# Patient Record
Sex: FEMALE | Race: WHITE | NOT HISPANIC OR LATINO | Employment: OTHER | URBAN - METROPOLITAN AREA
[De-identification: names, ages, dates, MRNs, and addresses within clinical notes are randomized per-mention and may not be internally consistent; named-entity substitution may affect disease eponyms.]

---

## 2020-01-17 ENCOUNTER — HOSPITAL ENCOUNTER (INPATIENT)
Facility: HOSPITAL | Age: 85
LOS: 3 days | Discharge: NURSING, REHAB OR LONG-TERM CARE | DRG: 481 | End: 2020-01-20
Attending: EMERGENCY MEDICINE | Admitting: INTERNAL MEDICINE
Payer: MEDICARE

## 2020-01-17 ENCOUNTER — ANESTHESIA EVENT (INPATIENT)
Dept: PERIOP | Facility: HOSPITAL | Age: 85
DRG: 481 | End: 2020-01-17
Payer: MEDICARE

## 2020-01-17 ENCOUNTER — APPOINTMENT (EMERGENCY)
Dept: RADIOLOGY | Facility: HOSPITAL | Age: 85
DRG: 481 | End: 2020-01-17
Payer: MEDICARE
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
## 2020-01-17 DIAGNOSIS — S72.141A CLOSED INTERTROCHANTERIC FRACTURE OF HIP, RIGHT, INITIAL ENCOUNTER (HCC): ICD-10-CM

## 2020-01-17 PROBLEM — D72.829 LEUCOCYTOSIS: Status: ACTIVE | Noted: 2020-01-17

## 2020-01-17 PROBLEM — S72.001A CLOSED DISPLACED FRACTURE OF RIGHT FEMORAL NECK (HCC): Status: ACTIVE | Noted: 2020-01-17

## 2020-01-17 PROBLEM — R47.01 APHASIA: Status: ACTIVE | Noted: 2020-01-17

## 2020-01-17 PROBLEM — F03.90 DEMENTIA (HCC): Status: ACTIVE | Noted: 2020-01-17

## 2020-01-17 PROBLEM — N18.30 CKD (CHRONIC KIDNEY DISEASE) STAGE 3, GFR 30-59 ML/MIN (HCC): Status: ACTIVE | Noted: 2020-01-17

## 2020-01-17 LAB
ANION GAP SERPL CALCULATED.3IONS-SCNC: 9 MMOL/L (ref 4–13)
APTT PPP: 29 SECONDS (ref 23–37)
ATRIAL RATE: 57 BPM
BASOPHILS # BLD AUTO: 0.04 THOUSANDS/ΜL (ref 0–0.1)
BASOPHILS NFR BLD AUTO: 0 % (ref 0–1)
BUN SERPL-MCNC: 22 MG/DL (ref 5–25)
CALCIUM SERPL-MCNC: 9 MG/DL (ref 8.3–10.1)
CHLORIDE SERPL-SCNC: 103 MMOL/L (ref 100–108)
CO2 SERPL-SCNC: 28 MMOL/L (ref 21–32)
CREAT SERPL-MCNC: 0.93 MG/DL (ref 0.6–1.3)
EOSINOPHIL # BLD AUTO: 0.56 THOUSAND/ΜL (ref 0–0.61)
EOSINOPHIL NFR BLD AUTO: 4 % (ref 0–6)
ERYTHROCYTE [DISTWIDTH] IN BLOOD BY AUTOMATED COUNT: 13.2 % (ref 11.6–15.1)
GFR SERPL CREATININE-BSD FRML MDRD: 57 ML/MIN/1.73SQ M
GLUCOSE SERPL-MCNC: 135 MG/DL (ref 65–140)
HCT VFR BLD AUTO: 38.5 % (ref 34.8–46.1)
HGB BLD-MCNC: 12 G/DL (ref 11.5–15.4)
IMM GRANULOCYTES # BLD AUTO: 0.17 THOUSAND/UL (ref 0–0.2)
IMM GRANULOCYTES NFR BLD AUTO: 1 % (ref 0–2)
INR PPP: 1.07 (ref 0.84–1.19)
LYMPHOCYTES # BLD AUTO: 3.06 THOUSANDS/ΜL (ref 0.6–4.47)
LYMPHOCYTES NFR BLD AUTO: 24 % (ref 14–44)
MCH RBC QN AUTO: 28.3 PG (ref 26.8–34.3)
MCHC RBC AUTO-ENTMCNC: 31.2 G/DL (ref 31.4–37.4)
MCV RBC AUTO: 91 FL (ref 82–98)
MONOCYTES # BLD AUTO: 0.6 THOUSAND/ΜL (ref 0.17–1.22)
MONOCYTES NFR BLD AUTO: 5 % (ref 4–12)
NEUTROPHILS # BLD AUTO: 8.28 THOUSANDS/ΜL (ref 1.85–7.62)
NEUTS SEG NFR BLD AUTO: 66 % (ref 43–75)
NRBC BLD AUTO-RTO: 0 /100 WBCS
P AXIS: 62 DEGREES
PLATELET # BLD AUTO: 354 THOUSANDS/UL (ref 149–390)
PMV BLD AUTO: 10.7 FL (ref 8.9–12.7)
POTASSIUM SERPL-SCNC: 3.9 MMOL/L (ref 3.5–5.3)
PR INTERVAL: 142 MS
PROTHROMBIN TIME: 13.6 SECONDS (ref 11.6–14.5)
QRS AXIS: 15 DEGREES
QRSD INTERVAL: 86 MS
QT INTERVAL: 444 MS
QTC INTERVAL: 432 MS
RBC # BLD AUTO: 4.24 MILLION/UL (ref 3.81–5.12)
SODIUM SERPL-SCNC: 140 MMOL/L (ref 136–145)
T WAVE AXIS: 97 DEGREES
VENTRICULAR RATE: 57 BPM
WBC # BLD AUTO: 12.71 THOUSAND/UL (ref 4.31–10.16)

## 2020-01-17 PROCEDURE — 85730 THROMBOPLASTIN TIME PARTIAL: CPT | Performed by: EMERGENCY MEDICINE

## 2020-01-17 PROCEDURE — 71045 X-RAY EXAM CHEST 1 VIEW: CPT

## 2020-01-17 PROCEDURE — 96361 HYDRATE IV INFUSION ADD-ON: CPT

## 2020-01-17 PROCEDURE — 85025 COMPLETE CBC W/AUTO DIFF WBC: CPT | Performed by: EMERGENCY MEDICINE

## 2020-01-17 PROCEDURE — 99285 EMERGENCY DEPT VISIT HI MDM: CPT | Performed by: EMERGENCY MEDICINE

## 2020-01-17 PROCEDURE — 99285 EMERGENCY DEPT VISIT HI MDM: CPT

## 2020-01-17 PROCEDURE — 93010 ELECTROCARDIOGRAM REPORT: CPT | Performed by: INTERNAL MEDICINE

## 2020-01-17 PROCEDURE — 96374 THER/PROPH/DIAG INJ IV PUSH: CPT

## 2020-01-17 PROCEDURE — 85610 PROTHROMBIN TIME: CPT | Performed by: EMERGENCY MEDICINE

## 2020-01-17 PROCEDURE — 99222 1ST HOSP IP/OBS MODERATE 55: CPT | Performed by: INTERNAL MEDICINE

## 2020-01-17 PROCEDURE — 99223 1ST HOSP IP/OBS HIGH 75: CPT | Performed by: ORTHOPAEDIC SURGERY

## 2020-01-17 PROCEDURE — 80048 BASIC METABOLIC PNL TOTAL CA: CPT | Performed by: EMERGENCY MEDICINE

## 2020-01-17 PROCEDURE — 73502 X-RAY EXAM HIP UNI 2-3 VIEWS: CPT

## 2020-01-17 PROCEDURE — 36415 COLL VENOUS BLD VENIPUNCTURE: CPT | Performed by: EMERGENCY MEDICINE

## 2020-01-17 PROCEDURE — 73502 X-RAY EXAM HIP UNI 2-3 VIEWS: CPT | Performed by: ORTHOPAEDIC SURGERY

## 2020-01-17 PROCEDURE — 93005 ELECTROCARDIOGRAM TRACING: CPT

## 2020-01-17 PROCEDURE — 72100 X-RAY EXAM L-S SPINE 2/3 VWS: CPT

## 2020-01-17 PROCEDURE — 1123F ACP DISCUSS/DSCN MKR DOCD: CPT | Performed by: PHYSICIAN ASSISTANT

## 2020-01-17 RX ORDER — ACETAMINOPHEN 325 MG/1
650 TABLET ORAL EVERY 6 HOURS SCHEDULED
Status: DISCONTINUED | OUTPATIENT
Start: 2020-01-17 | End: 2020-01-20 | Stop reason: HOSPADM

## 2020-01-17 RX ORDER — LIDOCAINE 50 MG/G
1 PATCH TOPICAL DAILY
Status: DISCONTINUED | OUTPATIENT
Start: 2020-01-17 | End: 2020-01-18

## 2020-01-17 RX ORDER — CHOLECALCIFEROL (VITAMIN D3) 125 MCG
9000 CAPSULE ORAL
COMMUNITY

## 2020-01-17 RX ORDER — ACETAMINOPHEN 325 MG/1
650 TABLET ORAL EVERY 6 HOURS PRN
Status: DISCONTINUED | OUTPATIENT
Start: 2020-01-17 | End: 2020-01-17

## 2020-01-17 RX ORDER — FENTANYL CITRATE 50 UG/ML
25 INJECTION, SOLUTION INTRAMUSCULAR; INTRAVENOUS ONCE
Status: COMPLETED | OUTPATIENT
Start: 2020-01-17 | End: 2020-01-17

## 2020-01-17 RX ORDER — SODIUM CHLORIDE, SODIUM LACTATE, POTASSIUM CHLORIDE, CALCIUM CHLORIDE 600; 310; 30; 20 MG/100ML; MG/100ML; MG/100ML; MG/100ML
75 INJECTION, SOLUTION INTRAVENOUS CONTINUOUS
Status: DISCONTINUED | OUTPATIENT
Start: 2020-01-17 | End: 2020-01-17

## 2020-01-17 RX ORDER — OXYCODONE HYDROCHLORIDE 5 MG/1
5 TABLET ORAL EVERY 4 HOURS PRN
Status: DISCONTINUED | OUTPATIENT
Start: 2020-01-17 | End: 2020-01-18

## 2020-01-17 RX ORDER — LORATADINE 10 MG/1
10 TABLET ORAL DAILY
COMMUNITY

## 2020-01-17 RX ORDER — ACETAMINOPHEN 325 MG/1
650 TABLET ORAL EVERY 6 HOURS PRN
COMMUNITY

## 2020-01-17 RX ORDER — SODIUM CHLORIDE, SODIUM LACTATE, POTASSIUM CHLORIDE, CALCIUM CHLORIDE 600; 310; 30; 20 MG/100ML; MG/100ML; MG/100ML; MG/100ML
75 INJECTION, SOLUTION INTRAVENOUS CONTINUOUS
Status: DISCONTINUED | OUTPATIENT
Start: 2020-01-18 | End: 2020-01-18

## 2020-01-17 RX ADMIN — LIDOCAINE 1 PATCH: 50 PATCH TOPICAL at 16:00

## 2020-01-17 RX ADMIN — ACETAMINOPHEN 650 MG: 325 TABLET ORAL at 19:58

## 2020-01-17 RX ADMIN — FENTANYL CITRATE 25 MCG: 50 INJECTION, SOLUTION INTRAMUSCULAR; INTRAVENOUS at 11:03

## 2020-01-17 RX ADMIN — SODIUM CHLORIDE 250 ML: 0.9 INJECTION, SOLUTION INTRAVENOUS at 11:03

## 2020-01-17 NOTE — ASSESSMENT & PLAN NOTE
Patient with documented history of ? aphasia, but she is able to talk albeit inappropriate answers  Attempts to get more history from [facility]) and Nursing home proved abortive

## 2020-01-17 NOTE — CONSULTS
Consultation - Marlen Juancarlos 81 y o  female MRN: [id number]  Unit/Bed#: -01 Encounter: [id number]      Assessment/Plan     Assessment:  Right basicervical femoral neck fracture    Plan:  Right hip greater trochanter rodding to be done by Dr Dawit Rebolledo tomorrow morningpending informed consent from family  Dr Dawit Rebolledo is trying to get in touch with the patient's family member at this time to discuss surgery  NPO at midnight  Pain control  Bed rest, nonweightbearing to right lower extremity  Fall precautions  DVT prophylaxis- mechanical only    History of Present Illness   Physician Requesting Consult: Thuy Rivera MD  Reason for Consult / Principal Problem: right hip pain  HPI: Star Arce is a 81y o  year old female who presents with right hip pain after suffering a witnessed fall in her nursing home  Patient has aphasia and dementia and is unable to answer questions appropriately  History was obtained from chart  She is brought to the emergency room via ambulance and x-rays revealed a right basicervical femoral neck fracture  She is admitted to the medical service and Orthopedics was consulted  Inpatient consult to Orthopedic Surgery  Consult performed by: Mau Bartlett PA-C  Consult ordered by: Thuy Rivera MD          Review of Systems   Unable to perform ROS: Dementia       Historical Information   Past Medical History:   Diagnosis Date    Aphasia     Dementia ([facility] [address] )     Disease of thyroid gland      History reviewed  No pertinent surgical history    Social History   Social History     Substance and Sexual Activity   Alcohol Use Not Currently     Social History     Substance and Sexual Activity   Drug Use Never     Social History     Tobacco Use   Smoking Status Former Smoker     Family History: non-contributory    Meds/Allergies   all current active meds have been reviewed  No Known Allergies    Objective   Vitals: Blood pressure 145/74, pulse 58, temperature 98 °F (36 7 °C), resp  rate 18, height 5' 8" (1 727 m), weight 68 kg (150 lb), SpO2 96 %  ,Body mass index is 22 81 kg/m²  No intake or output data in the 24 hours ending 01/17/20 1345  No intake/output data recorded  Invasive Devices     Peripheral Intravenous Line            Peripheral IV 01/17/20 Right Antecubital less than 1 day                Physical Exam   Constitutional: She is oriented to person, place, and time  She appears well-developed and well-nourished  No distress  HENT:   Head: Normocephalic and atraumatic  Mouth/Throat: Oropharynx is clear and moist    Eyes: Pupils are equal, round, and reactive to light  Conjunctivae and EOM are normal    Neck: Normal range of motion  Cardiovascular: Normal rate and intact distal pulses  Pulmonary/Chest: Effort normal and breath sounds normal  No respiratory distress  Abdominal: Soft  Bowel sounds are normal  There is no tenderness  Musculoskeletal: She exhibits tenderness  Neurological: She is alert and oriented to person, place, and time  Skin: Skin is warm and dry  Psychiatric: She has a normal mood and affect  Right Hip Exam     Tenderness   The patient is experiencing tenderness in the anterior  Other   Erythema: absent  Scars: absent  Sensation: normal  Pulse: present    Comments:  Pain with logroll  Thigh and calf soft, nontender  No open wounds  Actively moving ankle and toes              Lab Results: I have personally reviewed pertinent lab results  Imaging Studies: I have personally reviewed pertinent films in PACS  X-rays right hip:  Basicervical femoral neck fracture    Displacement of lesser trochanter

## 2020-01-17 NOTE — ASSESSMENT & PLAN NOTE
Patient presenting after a fall, found to have a right femoral neck fracture  Right hip Xray - Acute right femoral neck/intertrochanteric fracture with displacement of the lesser trochanter noted    · Orthopedics consulted in the ED, they would be operating later today  · Patient medically cleared for surgery  · DVt prophylaxis with Lovenox, to start tomorrow  · Pain control with Tylenol prn for mild pain and oxycodone prn for severe pain  · PT/OT eval  · Currently NPO

## 2020-01-17 NOTE — PLAN OF CARE
Problem: Potential for Falls  Goal: Patient will remain free of falls  Description  INTERVENTIONS:  - Assess patient frequently for physical needs  -  Identify cognitive and physical deficits and behaviors that affect risk of falls    -  Chandler fall precautions as indicated by assessment   - Educate patient/family on patient safety including physical limitations  - Instruct patient to call for assistance with activity based on assessment  - Modify environment to reduce risk of injury  - Consider OT/PT consult to assist with strengthening/mobility  Outcome: Progressing     Problem: Prexisting or High Potential for Compromised Skin Integrity  Goal: Skin integrity is maintained or improved  Description  INTERVENTIONS:  - Identify patients at risk for skin breakdown  - Assess and monitor skin integrity  - Assess and monitor nutrition and hydration status  - Monitor labs   - Assess for incontinence   - Turn and reposition patient  - Assist with mobility/ambulation  - Relieve pressure over bony prominences  - Avoid friction and shearing  - Provide appropriate hygiene as needed including keeping skin clean and dry  - Evaluate need for skin moisturizer/barrier cream  - Collaborate with interdisciplinary team   - Patient/family teaching  - Consider wound care consult   Outcome: Progressing     Problem: PAIN - ADULT  Goal: Verbalizes/displays adequate comfort level or baseline comfort level  Description  Interventions:  - Encourage patient to monitor pain and request assistance  - Assess pain using appropriate pain scale  - Administer analgesics based on type and severity of pain and evaluate response  - Implement non-pharmacological measures as appropriate and evaluate response  - Consider cultural and social influences on pain and pain management  - Notify physician/advanced practitioner if interventions unsuccessful or patient reports new pain  Outcome: Progressing     Problem: INFECTION - ADULT  Goal: Absence or prevention of progression during hospitalization  Description  INTERVENTIONS:  - Assess and monitor for signs and symptoms of infection  - Monitor lab/diagnostic results  - Monitor all insertion sites, i e  indwelling lines, tubes, and drains  - Monitor endotracheal if appropriate and nasal secretions for changes in amount and color  - [place] appropriate cooling/warming therapies per order  - Administer medications as ordered  - Instruct and encourage patient and family to use good hand hygiene technique  - Identify and instruct in appropriate isolation precautions for identified infection/condition  Outcome: Progressing  Goal: Absence of fever/infection during neutropenic period  Description  INTERVENTIONS:  - Monitor WBC    Outcome: Progressing     Problem: SAFETY ADULT  Goal: Maintain or return to baseline ADL function  Description  INTERVENTIONS:  -  Assess patient's ability to carry out ADLs; assess patient's baseline for ADL function and identify physical deficits which impact ability to perform ADLs (bathing, care of mouth/teeth, toileting, grooming, dressing, etc )  - Assess/evaluate cause of self-care deficits   - Assess range of motion  - Assess patient's mobility; develop plan if impaired  - Assess patient's need for assistive devices and provide as appropriate  - Encourage maximum independence but intervene and supervise when necessary  - Involve family in performance of ADLs  - Assess for home care needs following discharge   - Consider OT consult to assist with ADL evaluation and planning for discharge  - Provide patient education as appropriate  Outcome: Progressing  Goal: Maintain or return mobility status to optimal level  Description  INTERVENTIONS:  - Assess patient's baseline mobility status (ambulation, transfers, stairs, etc )    - Identify cognitive and physical deficits and behaviors that affect mobility  - Identify mobility aids required to assist with transfers and/or ambulation (gait belt, sit-to-stand, lift, walker, cane, etc )  - [place] fall precautions as indicated by assessment  - Record patient progress and toleration of activity level on Mobility SBAR; progress patient to next Phase/Stage  - Instruct patient to call for assistance with activity based on assessment  - Consider rehabilitation consult to assist with strengthening/weightbearing, etc   Outcome: Progressing     Problem: DISCHARGE PLANNING  Goal: Discharge to home or other facility with appropriate resources  Description  INTERVENTIONS:  - Identify barriers to discharge w/patient and caregiver  - Arrange for needed discharge resources and transportation as appropriate  - Identify discharge learning needs (meds, wound care, etc )  - Arrange for interpretive services to assist at discharge as needed  - Refer to Case Management Department for coordinating discharge planning if the patient needs post-hospital services based on physician/advanced practitioner order or complex needs related to functional status, cognitive ability, or social support system  Outcome: Progressing

## 2020-01-17 NOTE — H&P
H&P- Heide Wright 1935, 81 y o  female MRN: [id number]    Unit/Bed#: -01 Encounter: [id number]    Primary Care Provider: Papi Padgett DO   Date and time admitted to hospital: 1/17/2020 10:48 AM        * Closed displaced fracture of right femoral neck [facility])  Assessment & Plan  Patient presenting after a fall, found to have a right femoral neck fracture  Right hip Xray - Acute right femoral neck/intertrochanteric fracture with displacement of the lesser trochanter noted  · Orthopedics consulted in the ED, they would be operating later today  · Patient medically cleared for surgery  · DVt prophylaxis with Lovenox, to start tomorrow  · Pain control with Tylenol prn for mild pain and oxycodone prn for severe pain  · PT/OT eval  · Currently NPO    Leucocytosis  Assessment & Plan  Leucocytosis 12 71, present on admission, No source of infection  Patient non-toxic looking, hemodynamically stable  Trend CBC and temp curve for now  Hold off on antibiiotics    Dementia [facility])  Assessment & Plan  History of dementia    Aphasia  Assessment & Plan  Patient with documented history of ? aphasia, but she is able to talk albeit inappropriate answers  Attempts to get more history from Son [facility] Bhatti) and Nursing home proved abortive    VTE Prophylaxis: Enoxaparin (Lovenox)  / sequential compression device   Code Status: Level 1  POLST: There is no POLST form on file for this patient (pre-hospital)  Discussion with family:  Calls placed to primary contact Ashley Davis  Beeping tone, like phone disconnected? [facility]    Anticipated Length of Stay:  Patient will be admitted on an Inpatient basis with an anticipated length of stay of  greater 2 midnights  Justification for Hospital Stay: right hip fracture    Total Time for Visit, including Counseling / Coordination of Care: 1 hour  Greater than 50% of this total time spent on direct patient counseling and coordination of care      Chief Complaint:   Fall    History of Present Illness:    Daine Opitz is a 81 y o  female with past medical history of dementia who presents from [address] [place] [address] after a witnessed fall  History is gotten from ER records as patient has dementia, and nursing home is not picking up  Per records, patient fell this morning, but did not hit her head or lose consciousness  On presentation, she was hypertensive 142/96 > 179/78  HR 53  Labs were unremarkable except for leucocytosis, WBC 12 71  Trauma work up showed an acute right femoral neck/intertrochanteric fracture with displacement of the lesser trochanter    Review of Systems:    Review of Systems   Unable to perform ROS: Dementia       Past Medical and Surgical History:     Past Medical History:   Diagnosis Date    Aphasia     Dementia ([facility] [address] )     Disease of thyroid gland        History reviewed  No pertinent surgical history  Meds/Allergies:    Prior to Admission medications    Medication Sig Start Date End Date Taking? Authorizing Provider   acetaminophen (TYLENOL) 325 mg tablet Take 650 mg by mouth every 6 (six) hours as needed for mild pain   Yes Historical Provider, MD   Cholecalciferol (VITAMIN D-3) 5000 UNIT/ML LIQD Place under the tongue   Yes Historical Provider, MD   lactase (LACTAID) 3,000 units tablet Take 9,000 Units by mouth 3 (three) times a day with meals   Yes Historical Provider, MD   loratadine (CLARITIN) 10 mg tablet Take 10 mg by mouth daily   Yes Historical Provider, MD   Magnesium Hydroxide (MILK OF MAGNESIA PO) Take 30 mL by mouth   Yes Historical Provider, MD     I have reviewed home medications using allscripts  Allergies: No Known Allergies    Social History:     Marital Status:     Occupation:  Retired  Patient Pre-hospital Living Situation:  Lives at [address] [place] [address]  Patient Pre-hospital Level of Mobility:  Unknown, unable to obtain  Patient Pre-hospital Diet Restrictions:  None  Substance Use History: Social History     Substance and Sexual Activity   Alcohol Use Not Currently     Social History     Tobacco Use   Smoking Status Former Smoker     Social History     Substance and Sexual Activity   Drug Use Never       Family History:    No family history on file  Physical Exam:     Vitals:   Blood Pressure: 145/74 (01/17/20 1305)  Pulse: 58 (01/17/20 1305)  Temperature: 98 °F (36 7 °C) (01/17/20 1305)  Temp Source: Tympanic (01/17/20 1046)  Respirations: 18 (01/17/20 1220)  Height: 5' 8" (172 7 cm) (01/17/20 1046)  Weight - Scale: 68 kg (150 lb) (01/17/20 1046)  SpO2: 96 % (01/17/20 1305)    Physical Exam   Constitutional: She appears well-developed  HENT:   Head: Normocephalic and atraumatic  Eyes: Conjunctivae and EOM are normal  Right eye exhibits no discharge  Left eye exhibits no discharge  No scleral icterus  Neck: Normal range of motion  Neck supple  Cardiovascular: Normal rate  Murmur heard  Pulmonary/Chest: Effort normal and breath sounds normal  No stridor  No respiratory distress  She has no wheezes  She has no rales  She exhibits no tenderness  Abdominal: Soft  Bowel sounds are normal  She exhibits no distension and no mass  There is no tenderness  There is no guarding  Musculoskeletal: She exhibits tenderness and deformity (Externally rotated right lower extremity)  She exhibits no edema  Right hip: She exhibits decreased range of motion, decreased strength and tenderness  Neurological: She is alert  No cranial nerve deficit  Dementia   Skin: Skin is warm and dry  Capillary refill takes less than 2 seconds  No rash noted  She is not diaphoretic  No erythema  No pallor  Psychiatric: She has a normal mood and affect  Her behavior is normal    Nursing note and vitals reviewed  Additional Data:     Lab Results: I have personally reviewed pertinent reports        Results from last 7 days   Lab Units 01/17/20  1059   WBC Thousand/uL 12 71*   HEMOGLOBIN g/dL 12 0 HEMATOCRIT % 38 5   PLATELETS Thousands/uL 354   NEUTROS PCT % 66   LYMPHS PCT % 24   MONOS PCT % 5   EOS PCT % 4     Results from last 7 days   Lab Units 01/17/20  1059   SODIUM mmol/L 140   POTASSIUM mmol/L 3 9   CHLORIDE mmol/L 103   CO2 mmol/L 28   BUN mg/dL 22   CREATININE mg/dL 0 93   ANION GAP mmol/L 9   CALCIUM mg/dL 9 0   GLUCOSE RANDOM mg/dL 135     Results from last 7 days   Lab Units 01/17/20  1059   INR  1 07                   Imaging: I have personally reviewed pertinent reports  XR chest 1 view portable   Final Result by Silviano Ngo MD (01/17 8975)      Limited study  No focal airspace consolidation identified  Workstation performed: [id number]         XR hip/pelv 2-3 vws right   Final Result by Silviano Ngo MD (01/17 9740)      Acute right femoral neck/intertrochanteric fracture with displacement of the lesser trochanter noted  Workstation performed: [id number]         XR lumbar spine 2 or 3 views   Final Result by Silviano Ngo MD (01/17 4551)      No acute osseous abnormality  Degenerative changes as described  Workstation performed: [id number]             EKG, Pathology, and Other Studies Reviewed on Admission:   · EKG: Sinus bradycardia    Allscripts / Epic Records Reviewed: Yes     ** Please Note: This note has been constructed using a voice recognition system   **

## 2020-01-17 NOTE — ASSESSMENT & PLAN NOTE
Leucocytosis 12 71, present on admission, No source of infection  Patient non-toxic looking, hemodynamically stable  Trend CBC and temp curve for now  Hold off on antibiiotics

## 2020-01-17 NOTE — ASSESSMENT & PLAN NOTE
· CKD 3, GFR 57 noted on admission  · No prior records to compare with    · Cr within normal limits, 0 98

## 2020-01-17 NOTE — ED PROVIDER NOTES
H&P Exam - Trauma   Mj Olivares 81 y o  female MRN: [id number]  Unit/Bed#: /-01 Encounter: [id number]    Assessment/Plan   Trauma Alert: Trauma Acuity: Trauma Evaluation  Model of Arrival: Mode of Arrival: BLS via Trauma Squad Name and Number: [id number]  Trauma Team: Current Providers  Attending Provider: Regine Morton DO  Attending Provider: Alyssa Manrique MD  Registered Nurse: Char Salter RN  Registered Nurse: Isabel Williamson RN  Consulting Physician: Narciso Thibodeaux MD  Surgeon: Narciso Thibodeaux MD  Patient Care Assistant: Jason Tapia  Registered Nurse: Alon Martin RN  Registered Nurse: Itzel Braun RN  Consultants: None    Trauma Active Problems:  Weakness fall with right hip injury    Trauma Plan:  Imaging, analgesia, labs and reassessment  Clinically likely has right hip fracture and will need admission if so  Chief Complaint:   Chief Complaint   Patient presents with   [place] Fall     pt presents to ED via EMS from [facility] following a witnessed fall  Pt is c/o right hip pain  NO loc  History of Present Illness   HPI:  Mj Olivares is a 81 y o  female who presents with hip pain after witnessed fall     Mechanism:Details of Incident: witnessed fall on right hip , no LOC, didnot hit head, no bloodthinners Injury Date: 01/17/20        This 81-year-old female with history of expressive and receptive aphasia and dementia had a witnessed fall at nursing home this morning  She injured her right hip but did not strike her head  She does indicate some lumbar discomfort as well  There was no loss of consciousness  She does not take anticoagulants or anti-platelet agents  Review of Systems   Unable to perform ROS: Dementia       Historical Information     Immunizations: There is no immunization history on file for this patient      Past Medical History:   Diagnosis Date    Aphasia     Dementia ([facility] [address] )     Disease of thyroid gland      No family history on file  History reviewed  No pertinent surgical history  Social History     Socioeconomic History    Marital status:      Spouse name: None    Number of children: None    Years of education: None    Highest education level: None   Occupational History    None   Social Needs    Financial resource strain: None    Food insecurity:     Worry: None     Inability: None    Transportation needs:     Medical: None     Non-medical: None   Tobacco Use    Smoking status: Former Smoker   Substance and Sexual Activity    Alcohol use: Not Currently    Drug use: Never    Sexual activity: Not Currently   Lifestyle    Physical activity:     Days per week: None     Minutes per session: None    Stress: None   Relationships    Social connections:     Talks on phone: None     Gets together: None     Attends Sabianism service: None     Active member of club or organization: None     Attends meetings of clubs or organizations: None     Relationship status: None    Intimate partner violence:     Fear of current or ex partner: None     Emotionally abused: None     Physically abused: None     Forced sexual activity: None   Other Topics Concern    None   Social History Narrative    None       Family History: non-contributory    Meds/Allergies   Prior to Admission Medications   Prescriptions Last Dose Informant Patient Reported? Taking?    Cholecalciferol (VITAMIN D-3) 5000 UNIT/ML LIQD   Yes Yes   Sig: Place under the tongue   Magnesium Hydroxide (MILK OF MAGNESIA PO)   Yes Yes   Sig: Take 30 mL by mouth   acetaminophen (TYLENOL) 325 mg tablet   Yes Yes   Sig: Take 650 mg by mouth every 6 (six) hours as needed for mild pain   lactase (LACTAID) 3,000 units tablet   Yes Yes   Sig: Take 9,000 Units by mouth 3 (three) times a day with meals   loratadine (CLARITIN) 10 mg tablet   Yes Yes   Sig: Take 10 mg by mouth daily      Facility-Administered Medications: None       No Known Allergies    PHYSICAL EXAM    PE limited by:  Dementia, aphasia, right hip injury    Objective   Vitals:   First set: Temperature: (!) 97 °F (36 1 °C) (01/17/20 1046)  Pulse: (!) 53 (01/17/20 1048)  Respirations: 18 (01/17/20 1048)  Blood Pressure: 142/96 (01/17/20 1048)  SpO2: 97 % (01/17/20 1048)    Primary Survey:   (A) Airway:  Normal vocalizations  (B) Breathing:  Symmetric air intake and chest rise  (C) Circulation: Pulses:   normal  (D) Disabliity:  GCS 14  (E) Expose:  Completed    Secondary Survey: (Click on Physical Exam tab above)  Physical Exam   Constitutional: She is oriented to person, place, and time  She appears well-developed and well-nourished  She appears distressed  HENT:   Head: Normocephalic and atraumatic  Right Ear: External ear normal    Left Ear: External ear normal    Mouth/Throat: Oropharynx is clear and moist    Edentulous  Upper plate is intact   Eyes: Pupils are equal, round, and reactive to light  Conjunctivae and EOM are normal    Neck: Normal range of motion  Neck supple  No JVD present  Cardiovascular: Normal rate, regular rhythm and intact distal pulses  No murmur heard  Pulmonary/Chest: Effort normal and breath sounds normal    Abdominal: Soft  Bowel sounds are normal  She exhibits no distension  There is no tenderness  There is no rebound and no guarding  Musculoskeletal: She exhibits tenderness (Right hip and lumbar spine)  She exhibits no edema  Pain limits moving the right hip  There is no spinal midline step-off or deformity  Neurological: She is alert and oriented to person, place, and time  She has normal reflexes  No cranial nerve deficit  Coordination normal    Skin: Skin is warm and dry  Capillary refill takes less than 2 seconds  No rash noted  She is not diaphoretic  Psychiatric: She has a normal mood and affect  Nursing note and vitals reviewed        Invasive Devices     Peripheral Intravenous Line            Peripheral IV 01/17/20 Right Antecubital less than 1 day Lab Results:   Results Reviewed     Procedure Component Value Units Date/Time    Protime-INR [[id number]]  (Normal) Collected:  01/17/20 1059    Lab Status:  Final result Specimen:  Blood from Arm, Right Updated:  01/17/20 1122     Protime 13 6 seconds      INR 1 07    APTT [[id number]]  (Normal) Collected:  01/17/20 1059    Lab Status:  Final result Specimen:  Blood from Arm, Right Updated:  01/17/20 1122     PTT 29 seconds     Basic metabolic panel [[id number]] Collected:  01/17/20 1059    Lab Status:  Final result Specimen:  Blood from Arm, Right Updated:  01/17/20 1122     Sodium 140 mmol/L      Potassium 3 9 mmol/L      Chloride 103 mmol/L      CO2 28 mmol/L      ANION GAP 9 mmol/L      BUN 22 mg/dL      Creatinine 0 93 mg/dL      Glucose 135 mg/dL      Calcium 9 0 mg/dL      eGFR 57 ml/min/1 73sq m     Narrative:       [place] guidelines for Chronic Kidney Disease (CKD):     Stage 1 with normal or high GFR (GFR > 90 mL/min/1 73 square meters)    Stage 2 Mild CKD (GFR = 60-89 mL/min/1 73 square meters)    Stage 3A Moderate CKD (GFR = 45-59 mL/min/1 73 square meters)    Stage 3B Moderate CKD (GFR = 30-44 mL/min/1 73 square meters)    Stage 4 Severe CKD (GFR = 15-29 mL/min/1 73 square meters)    Stage 5 End Stage CKD (GFR <15 mL/min/1 73 square meters)  Note: GFR calculation is accurate only with a steady state creatinine    CBC and differential [[id number]]  (Abnormal) Collected:  01/17/20 1059    Lab Status:  Final result Specimen:  Blood from Arm, Right Updated:  01/17/20 1109     WBC 12 71 Thousand/uL      RBC 4 24 Million/uL      Hemoglobin 12 0 g/dL      Hematocrit 38 5 %      MCV 91 fL      MCH 28 3 pg      MCHC 31 2 g/dL      RDW 13 2 %      MPV 10 7 fL      Platelets 437 Thousands/uL      nRBC 0 /100 WBCs      Neutrophils Relative 66 %      Immat GRANS % 1 %      Lymphocytes Relative 24 %      Monocytes Relative 5 %      Eosinophils Relative 4 %      Basophils Relative 0 %      Neutrophils Absolute 8 28 Thousands/µL      Immature Grans Absolute 0 17 Thousand/uL      Lymphocytes Absolute 3 06 Thousands/µL      Monocytes Absolute 0 60 Thousand/µL      Eosinophils Absolute 0 56 Thousand/µL      Basophils Absolute 0 04 Thousands/µL                  Imaging Studies:   Direct to CT: No  XR chest 1 view portable   Final Result by Genet Canchola MD (01/17 1138)      Limited study  No focal airspace consolidation identified  Workstation performed: [id number]         XR hip/pelv 2-3 vws right   Final Result by Genet Canchola MD (01/17 1143)      Acute right femoral neck/intertrochanteric fracture with displacement of the lesser trochanter noted  Workstation performed: [id number]         XR lumbar spine 2 or 3 views   Final Result by Genet Canchola MD (01/17 1145)      No acute osseous abnormality  Degenerative changes as described  Workstation performed: [id number]             Other Studies:  None    Code Status: Level 1 - Full Code  Advance Directive and Living Will:      Power of :    POLST:      Procedures  ECG 12 Lead Documentation Only  Date/Time: 1/17/2020 11:12 AM  Performed by: López Lazo DO  Authorized by: López Lazo DO     ECG reviewed by me, the ED Provider: yes    Patient location:  ED  Previous ECG:     Previous ECG:  Unavailable  Rhythm:     Rhythm: sinus bradycardia    Ectopy:     Ectopy: none    QRS:     QRS axis:  Normal    QRS intervals:  Normal  Conduction:     Conduction: normal    ST segments:     ST segments:  Normal  T waves:     T waves: non-specific    Q waves:     Q waves: AVL             ED Course         MDM  Number of Diagnoses or Management Options  Closed intertrochanteric fracture of hip, right, initial encounter [facility]): new and requires workup  Fall from standing, initial encounter: new and requires workup  Diagnosis management comments: Patient is hemodynamically stable    Case discussed with orthopedics they asked the patient be admitted to medical service with a consultation  There is no family here with her to get further consent for surgery, etc   Medical service agrees to admit her  Amount and/or Complexity of Data Reviewed  Clinical lab tests: ordered and reviewed  Tests in the radiology section of CPT®: ordered and reviewed  Obtain history from someone other than the patient: yes  Review and summarize past medical records: yes  Discuss the patient with other providers: yes  Independent visualization of images, tracings, or specimens: yes          Disposition  Priority One Transfer: No  Final diagnoses:   Fall from standing, initial encounter   Closed intertrochanteric fracture of hip, right, initial encounter [facility])     Time reflects when diagnosis was documented in both MDM as applicable and the Disposition within this note     Time User Action Codes Description Comment    1/17/2020 12:04 PM Delmer Iron Add [I01  XXXA] Fall from standing, initial encounter     1/17/2020 12:04 PM Delmer Iron Add [T92 051J] Closed intertrochanteric fracture of hip, right, initial encounter [facility])       ED Disposition     ED Disposition Condition Date/Time Comment    Admit Stable Fri Jan 17, 2020 12:04 PM Case was discussed with Dr Yoel Rubin and the patient's admission status was agreed to be Admission Status: inpatient status to the service of Dr Yoel Rubin           Follow-up Information    None       Current Discharge Medication List      CONTINUE these medications which have NOT CHANGED    Details   acetaminophen (TYLENOL) 325 mg tablet Take 650 mg by mouth every 6 (six) hours as needed for mild pain      Cholecalciferol (VITAMIN D-3) 5000 UNIT/ML LIQD Place under the tongue      lactase (LACTAID) 3,000 units tablet Take 9,000 Units by mouth 3 (three) times a day with meals      loratadine (CLARITIN) 10 mg tablet Take 10 mg by mouth daily      Magnesium Hydroxide (MILK OF MAGNESIA PO) Take 30 mL by mouth           No discharge procedures on file        ED Provider  Electronically Signed by         Migdalia Albert DO  01/17/20 6863

## 2020-01-18 ENCOUNTER — APPOINTMENT (INPATIENT)
Dept: RADIOLOGY | Facility: HOSPITAL | Age: 85
DRG: 481 | End: 2020-01-18
Payer: MEDICARE

## 2020-01-18 ENCOUNTER — ANESTHESIA (INPATIENT)
Dept: PERIOP | Facility: HOSPITAL | Age: 85
DRG: 481 | End: 2020-01-18
Payer: MEDICARE

## 2020-01-18 LAB
ANION GAP SERPL CALCULATED.3IONS-SCNC: 9 MMOL/L (ref 4–13)
BUN SERPL-MCNC: 24 MG/DL (ref 5–25)
CALCIUM SERPL-MCNC: 8.7 MG/DL (ref 8.3–10.1)
CHLORIDE SERPL-SCNC: 103 MMOL/L (ref 100–108)
CO2 SERPL-SCNC: 28 MMOL/L (ref 21–32)
CREAT SERPL-MCNC: 0.98 MG/DL (ref 0.6–1.3)
ERYTHROCYTE [DISTWIDTH] IN BLOOD BY AUTOMATED COUNT: 13.3 % (ref 11.6–15.1)
GFR SERPL CREATININE-BSD FRML MDRD: 54 ML/MIN/1.73SQ M
GLUCOSE SERPL-MCNC: 123 MG/DL (ref 65–140)
HCT VFR BLD AUTO: 34.1 % (ref 34.8–46.1)
HGB BLD-MCNC: 11 G/DL (ref 11.5–15.4)
MCH RBC QN AUTO: 28.7 PG (ref 26.8–34.3)
MCHC RBC AUTO-ENTMCNC: 32.3 G/DL (ref 31.4–37.4)
MCV RBC AUTO: 89 FL (ref 82–98)
PLATELET # BLD AUTO: 285 THOUSANDS/UL (ref 149–390)
PMV BLD AUTO: 11.2 FL (ref 8.9–12.7)
POTASSIUM SERPL-SCNC: 3.8 MMOL/L (ref 3.5–5.3)
RBC # BLD AUTO: 3.83 MILLION/UL (ref 3.81–5.12)
SODIUM SERPL-SCNC: 140 MMOL/L (ref 136–145)
WBC # BLD AUTO: 11.25 THOUSAND/UL (ref 4.31–10.16)

## 2020-01-18 PROCEDURE — 80048 BASIC METABOLIC PNL TOTAL CA: CPT | Performed by: INTERNAL MEDICINE

## 2020-01-18 PROCEDURE — 27245 TREAT THIGH FRACTURE: CPT | Performed by: ORTHOPAEDIC SURGERY

## 2020-01-18 PROCEDURE — 73502 X-RAY EXAM HIP UNI 2-3 VIEWS: CPT

## 2020-01-18 PROCEDURE — C1769 GUIDE WIRE: HCPCS | Performed by: ORTHOPAEDIC SURGERY

## 2020-01-18 PROCEDURE — 99232 SBSQ HOSP IP/OBS MODERATE 35: CPT | Performed by: PHYSICIAN ASSISTANT

## 2020-01-18 PROCEDURE — C1713 ANCHOR/SCREW BN/BN,TIS/BN: HCPCS | Performed by: ORTHOPAEDIC SURGERY

## 2020-01-18 PROCEDURE — 85027 COMPLETE CBC AUTOMATED: CPT | Performed by: INTERNAL MEDICINE

## 2020-01-18 PROCEDURE — 0QS606Z REPOSITION RIGHT UPPER FEMUR WITH INTRAMEDULLARY INTERNAL FIXATION DEVICE, OPEN APPROACH: ICD-10-PCS | Performed by: ORTHOPAEDIC SURGERY

## 2020-01-18 DEVICE — 11.0MM TI TROCH FIXATION NAIL SCREW/90MM - STERILE: Type: IMPLANTABLE DEVICE | Site: HIP | Status: FUNCTIONAL

## 2020-01-18 DEVICE — 12MM/130 DEG TI CANN TROCH FIXATION NAIL 170MM-STERILE: Type: IMPLANTABLE DEVICE | Site: HIP | Status: FUNCTIONAL

## 2020-01-18 DEVICE — 5.0MM TI LOCKING SCREW 34MM- FOR IM NAILS-STERILE: Type: IMPLANTABLE DEVICE | Site: HIP | Status: FUNCTIONAL

## 2020-01-18 RX ORDER — MAGNESIUM HYDROXIDE 1200 MG/15ML
LIQUID ORAL AS NEEDED
Status: DISCONTINUED | OUTPATIENT
Start: 2020-01-18 | End: 2020-01-18 | Stop reason: HOSPADM

## 2020-01-18 RX ORDER — CEFAZOLIN SODIUM 1 G/50ML
1000 SOLUTION INTRAVENOUS EVERY 8 HOURS
Status: COMPLETED | OUTPATIENT
Start: 2020-01-18 | End: 2020-01-18

## 2020-01-18 RX ORDER — FENTANYL CITRATE 50 UG/ML
INJECTION, SOLUTION INTRAMUSCULAR; INTRAVENOUS AS NEEDED
Status: DISCONTINUED | OUTPATIENT
Start: 2020-01-18 | End: 2020-01-18 | Stop reason: SURG

## 2020-01-18 RX ORDER — CHLORHEXIDINE GLUCONATE 0.12 MG/ML
15 RINSE ORAL ONCE
Status: DISCONTINUED | OUTPATIENT
Start: 2020-01-18 | End: 2020-01-18

## 2020-01-18 RX ORDER — PROPOFOL 10 MG/ML
INJECTION, EMULSION INTRAVENOUS AS NEEDED
Status: DISCONTINUED | OUTPATIENT
Start: 2020-01-18 | End: 2020-01-18 | Stop reason: SURG

## 2020-01-18 RX ORDER — OXYCODONE HYDROCHLORIDE 5 MG/1
2.5 TABLET ORAL EVERY 4 HOURS PRN
Status: DISCONTINUED | OUTPATIENT
Start: 2020-01-18 | End: 2020-01-20 | Stop reason: HOSPADM

## 2020-01-18 RX ORDER — OXYCODONE HYDROCHLORIDE 5 MG/1
5 TABLET ORAL EVERY 4 HOURS PRN
Status: DISCONTINUED | OUTPATIENT
Start: 2020-01-18 | End: 2020-01-20 | Stop reason: HOSPADM

## 2020-01-18 RX ORDER — MIDAZOLAM HYDROCHLORIDE 2 MG/2ML
INJECTION, SOLUTION INTRAMUSCULAR; INTRAVENOUS AS NEEDED
Status: DISCONTINUED | OUTPATIENT
Start: 2020-01-18 | End: 2020-01-18 | Stop reason: SURG

## 2020-01-18 RX ORDER — ONDANSETRON 2 MG/ML
4 INJECTION INTRAMUSCULAR; INTRAVENOUS EVERY 6 HOURS PRN
Status: DISCONTINUED | OUTPATIENT
Start: 2020-01-18 | End: 2020-01-20 | Stop reason: HOSPADM

## 2020-01-18 RX ORDER — TETRACAINE HCL 10 MG/ML
INJECTION SUBARACHNOID AS NEEDED
Status: DISCONTINUED | OUTPATIENT
Start: 2020-01-18 | End: 2020-01-18 | Stop reason: SURG

## 2020-01-18 RX ORDER — CEFAZOLIN SODIUM 1 G/50ML
1000 SOLUTION INTRAVENOUS ONCE
Status: COMPLETED | OUTPATIENT
Start: 2020-01-18 | End: 2020-01-18

## 2020-01-18 RX ADMIN — OXYCODONE HYDROCHLORIDE 2.5 MG: 5 TABLET ORAL at 23:17

## 2020-01-18 RX ADMIN — ENOXAPARIN SODIUM 40 MG: 40 INJECTION SUBCUTANEOUS at 12:34

## 2020-01-18 RX ADMIN — ACETAMINOPHEN 650 MG: 325 TABLET ORAL at 23:17

## 2020-01-18 RX ADMIN — CEFAZOLIN SODIUM 1000 MG: 1 SOLUTION INTRAVENOUS at 09:44

## 2020-01-18 RX ADMIN — FENTANYL CITRATE 10 MCG: 50 INJECTION, SOLUTION INTRAMUSCULAR; INTRAVENOUS at 08:16

## 2020-01-18 RX ADMIN — TETRACAINE HCL 0.8 ML: 10 INJECTION SUBARACHNOID at 08:16

## 2020-01-18 RX ADMIN — MIDAZOLAM 1 MG: 1 INJECTION INTRAMUSCULAR; INTRAVENOUS at 08:32

## 2020-01-18 RX ADMIN — ACETAMINOPHEN 650 MG: 325 TABLET ORAL at 12:34

## 2020-01-18 RX ADMIN — ACETAMINOPHEN 650 MG: 325 TABLET ORAL at 05:23

## 2020-01-18 RX ADMIN — FENTANYL CITRATE 25 MCG: 50 INJECTION, SOLUTION INTRAMUSCULAR; INTRAVENOUS at 08:05

## 2020-01-18 RX ADMIN — SODIUM CHLORIDE, SODIUM LACTATE, POTASSIUM CHLORIDE, AND CALCIUM CHLORIDE 75 ML/HR: .6; .31; .03; .02 INJECTION, SOLUTION INTRAVENOUS at 00:05

## 2020-01-18 RX ADMIN — PROPOFOL 20 MG: 10 INJECTION, EMULSION INTRAVENOUS at 08:13

## 2020-01-18 RX ADMIN — ACETAMINOPHEN 650 MG: 325 TABLET ORAL at 18:19

## 2020-01-18 RX ADMIN — MIDAZOLAM 1 MG: 1 INJECTION INTRAMUSCULAR; INTRAVENOUS at 08:05

## 2020-01-18 RX ADMIN — CEFAZOLIN SODIUM 1000 MG: 1 SOLUTION INTRAVENOUS at 08:05

## 2020-01-18 RX ADMIN — OXYCODONE HYDROCHLORIDE 2.5 MG: 5 TABLET ORAL at 05:23

## 2020-01-18 RX ADMIN — CEFAZOLIN SODIUM 1000 MG: 1 SOLUTION INTRAVENOUS at 18:20

## 2020-01-18 RX ADMIN — ACETAMINOPHEN 650 MG: 325 TABLET ORAL at 00:05

## 2020-01-18 NOTE — OP NOTE
OPERATIVE REPORT  PATIENT NAME: Vishnu Hoover    :  1935  MRN: [id number]  Pt Location:  OR ROOM 02    SURGERY DATE: 2020    Surgeon(s) and Role:     * Henrry Delgado MD - Primary    Preop Diagnosis:  Closed intertrochanteric fracture of hip, right, initial encounter ([facility] ) [S72 141A]    Post-Op Diagnosis Codes:     * Closed intertrochanteric fracture of hip, right, initial encounter ([facility] ) [S72 141A]    Procedure(s) (LRB):  INSERTION NAIL IM FEMUR ANTEGRADE (TROCHANTERIC), RIGHT (Right)    Specimen(s):  * No specimens in log *    Estimated Blood Loss:   Minimal    Drains:  * No LDAs found *    Anesthesia Type:   Spinal    Hardware:  Synthes TFN short standard 12 mm nail, 90 mm dynamic screw, 34 mm distal locking screw    Operative Indications:  Closed intertrochanteric fracture of hip, right, initial encounter [facility]) [S72 141A]    Indication:  Vishnu Hoover is a 81y o  years old female fell and injured her right hip  Patient was diagnosed with right hip intertrochanteric fracture, displaced  Surgical treatment was recommended  Risks and complications were discussed with patient and family  A consent was signed  Procedure and Technique:  Patient was brought into the OR anesthestized and intubated with LMA without any difficulty  Patient was placed on fracture table and secured  Patient's feet were placed into a foot webb  Closed reduction was done with help of C-arm  The right hip fracture appeared to anatomically reduced both AP and Lateral views  The right hip was prepped and draped in a sterile fashion  A time out is called and identified the right hip as the operative site  2 cm incision was made proximal to the greater trochanter  Dissection was carried down to the fascia ronald which was divided sharply  A K-wire was inserted into the tip of the greater trochanter and into the proximal femur   Image is used to confirm the location of the guide pin both AP and lateral which appeared to be in good position  The guide pin is over reamed  A short standard 12 mm TFN nail was then inserted into the femoral canal  A radiolouncent guide was use to assess the depth of the nail  A guide pin was then inserted into the femoral head through the lateral proximal femur  Care was made sure the guide pin is in the center-center position  The dynamic screw measured to be 90 mm length, which is inserted without any issue  A locking screw was inserted proximally  Distal femoral locking screw also inserted with a guide without difficulty  The nail  guide was removed and final images were taken  The fracture appeared to be well aligned and hardware was in good position  The incisions were closed with staples  Sterile bulky dressing was applied  Patient was extubated and transfer to recovery room  Family was contacted      There was no qualified resident available to assist     Complications:   None    Patient Disposition:  PACU     SIGNATURE: Brandin Flores MD  DATE: January 18, 2020  TIME: 9:10 AM

## 2020-01-18 NOTE — ANESTHESIA PROCEDURE NOTES
Spinal Block    Patient location during procedure: OR  Start time: 1/18/2020 8:16 AM  Reason for block: primary anesthetic  Staffing  Anesthesiologist: Pankaj Talbert MD  Performed: anesthesiologist   Preanesthetic Checklist  Completed: patient identified, site marked, surgical consent, pre-op evaluation, timeout performed, IV checked, risks and benefits discussed and monitors and equipment checked  Spinal Block  Patient position: right lateral decubitus  Prep: Betadine  Patient monitoring: heart rate, cardiac monitor, continuous pulse ox and frequent blood pressure checks  Approach: midline  Location: L3-4  Injection technique: single-shot  Needle  Needle type: pencil-tip   Needle gauge: 25 G  Needle length: 10 cm  Assessment  Sensory level: T10  Injection Assessment:  negative aspiration for heme, no paresthesia on injection and positive aspiration for clear CSF    Post-procedure:  site cleaned

## 2020-01-18 NOTE — PROGRESS NOTES
[facility] Internal Medicine  Progress Note - Marcela Garcia 1935, 81 y o  female MRN: [id number]  Unit/Bed#: OR [place] Encounter: [id number]  Primary Care Provider: Nicko Kruger DO   Date and time admitted to hospital: 1/17/2020 10:48 AM    * Closed intertrochanteric fracture of hip, right, initial encounter [facility])  Assessment & Plan  · S/P nail placement 1/18    CKD (chronic kidney disease) stage 3, GFR 30-59 ml/min ([facility])  Assessment & Plan  · CKD 3, GFR 57 noted on admission  · No prior records to compare with  · Cr within normal limits, 0 98    Leucocytosis  Assessment & Plan  · Leucocytosis 12 71, present on admission, No source of infection  · Likely reactive to fracture  · Trending down  · Patient non-toxic looking, hemodynamically stable  · Trend CBC and temp curve for now  · Hold off on antibiiotics    Dementia ([facility] [address] )  Assessment & Plan  · History of dementia  · Supportive care, optimize sleep/wake cycles    Aphasia  Assessment & Plan  · Patient with documented history of possible aphasia, but she is able to talk albeit inappropriate answers  · Attempts to get more history from [facility]) and Nursing home proved abortive      VTE Pharmacologic Prophylaxis:   Pharmacologic: Enoxaparin (Lovenox)  Mechanical VTE Prophylaxis in Place: No    Patient Centered Rounds: I have performed bedside rounds with nursing staff today  Discussions with Specialists or Other Care Team Provider: Appreciate input from Orthopedic operative report today, reviewed with attending  Education and Discussions with Family / Patient: Discussed with patient    Time Spent for Care: 30 minutes  More than 50% of total time spent on counseling and coordination of care as described above      Current Length of Stay: 1 day(s)    Current Patient Status: Inpatient   Certification Statement: The patient will continue to require additional inpatient hospital stay due to Postoperative management, PT/OT    Discharge Plan: Patient lives at [address] [facility], patient will likely need rehab pending PT/OT recommendations  Anticipate discharge Monday  Code Status: Level 1 - Full Code      Subjective:   Patient is sleeping comfortably, minimally interactive secondary to postoperative somnolence and dementia    Objective:     Vitals:   Temp (24hrs), Av 9 °F (36 6 °C), Min:97 °F (36 1 °C), Max:99 2 °F (37 3 °C)    Temp:  [97 °F (36 1 °C)-99 2 °F (37 3 °C)] 98 5 °F (36 9 °C)  HR:  [52-69] 63  Resp:  [12-19] 18  BP: ()/(56-96) 166/83  SpO2:  [95 %-99 %] 96 %  Body mass index is 21 89 kg/m²  Input and Output Summary (last 24 hours): Intake/Output Summary (Last 24 hours) at 2020 1045  Last data filed at 2020 0914  Gross per 24 hour   Intake 845 ml   Output    Net 845 ml       Physical Exam:     Physical Exam   Constitutional: She appears well-developed and well-nourished  No distress  HENT:   Head: Normocephalic and atraumatic  Eyes: Conjunctivae are normal  No scleral icterus  Cardiovascular: Normal rate and regular rhythm  No murmur heard  Pulmonary/Chest: Effort normal and breath sounds normal  No respiratory distress  She has no wheezes  She has no rales  Abdominal: Soft  She exhibits no distension  There is no tenderness  Musculoskeletal: She exhibits no edema  Right hip: She exhibits tenderness (over surgical site  clean, dry, intact)  Neurological: She is alert  Skin: Skin is warm and dry  No erythema  Psychiatric: Cognition and memory are impaired  Nursing note and vitals reviewed        Additional Data:     Labs:    Results from last 7 days   Lab Units 20  0515 20  1059   WBC Thousand/uL 11 25* 12 71*   HEMOGLOBIN g/dL 11 0* 12 0   HEMATOCRIT % 34 1* 38 5   PLATELETS Thousands/uL 285 354   NEUTROS PCT %  --  66   LYMPHS PCT %  --  24   MONOS PCT %  --  5   EOS PCT %  --  4     Results from last 7 days   Lab Units 20  0515   SODIUM mmol/L 140 POTASSIUM mmol/L 3 8   CHLORIDE mmol/L 103   CO2 mmol/L 28   BUN mg/dL 24   CREATININE mg/dL 0 98   ANION GAP mmol/L 9   CALCIUM mg/dL 8 7   GLUCOSE RANDOM mg/dL 123     Results from last 7 days   Lab Units 01/17/20  1059   INR  1 07                       * I Have Reviewed All Lab Data Listed Above  * Additional Pertinent Lab Tests Reviewed: All Labs Within Last 24 Hours Reviewed    Imaging:    Imaging Reports Reviewed Today Include: None  Imaging Personally Reviewed by Myself Includes:  None    Recent Cultures (last 7 days):           Last 24 Hours Medication List:     Current Facility-Administered Medications:  acetaminophen 650 mg Oral Q6H [address] Kai Young MD    cefazolin 1,000 mg Intravenous Q8H Kai Young MD Last Rate: 1,000 mg (01/18/20 0944)   chlorhexidine 15 mL Swish & Spit Once Jose Carlos Story MD    enoxaparin 40 mg Subcutaneous Daily Kai Young MD    lactated ringers 75 mL/hr Intravenous Continuous Kai Young MD Last Rate: 75 mL/hr (01/18/20 0005)   ondansetron 4 mg Intravenous Q6H PRN Kai Young MD    oxyCODONE 2 5 mg Oral Q4H PRN Kai Young MD    Or        oxyCODONE 5 mg Oral Q4H PRN Kai Young MD         Today, Patient Was Seen By: Jarrett Tam PA-C    ** Please Note: Dictation voice to text software may have been used in the creation of this document   **

## 2020-01-18 NOTE — PLAN OF CARE
Problem: Potential for Falls  Goal: Patient will remain free of falls  Description  INTERVENTIONS:  - Assess patient frequently for physical needs  -  Identify cognitive and physical deficits and behaviors that affect risk of falls    -  [place] fall precautions as indicated by assessment   - Educate patient/family on patient safety including physical limitations  - Instruct patient to call for assistance with activity based on assessment  - Modify environment to reduce risk of injury  - Consider OT/PT consult to assist with strengthening/mobility  Outcome: Progressing     Problem: Prexisting or High Potential for Compromised Skin Integrity  Goal: Skin integrity is maintained or improved  Description  INTERVENTIONS:  - Identify patients at risk for skin breakdown  - Assess and monitor skin integrity  - Assess and monitor nutrition and hydration status  - Monitor labs   - Assess for incontinence   - Turn and reposition patient  - Assist with mobility/ambulation  - Relieve pressure over bony prominences  - Avoid friction and shearing  - Provide appropriate hygiene as needed including keeping skin clean and dry  - Evaluate need for skin moisturizer/barrier cream  - Collaborate with interdisciplinary team   - Patient/family teaching  - Consider wound care consult   Outcome: Progressing     Problem: PAIN - ADULT  Goal: Verbalizes/displays adequate comfort level or baseline comfort level  Description  Interventions:  - Encourage patient to monitor pain and request assistance  - Assess pain using appropriate pain scale  - Administer analgesics based on type and severity of pain and evaluate response  - Implement non-pharmacological measures as appropriate and evaluate response  - Consider cultural and social influences on pain and pain management  - Notify physician/advanced practitioner if interventions unsuccessful or patient reports new pain  Outcome: Progressing     Problem: INFECTION - ADULT  Goal: Absence or prevention of progression during hospitalization  Description  INTERVENTIONS:  - Assess and monitor for signs and symptoms of infection  - Monitor lab/diagnostic results  - Monitor all insertion sites, i e  indwelling lines, tubes, and drains  - Monitor endotracheal if appropriate and nasal secretions for changes in amount and color  - [place] appropriate cooling/warming therapies per order  - Administer medications as ordered  - Instruct and encourage patient and family to use good hand hygiene technique  - Identify and instruct in appropriate isolation precautions for identified infection/condition  Outcome: Progressing  Goal: Absence of fever/infection during neutropenic period  Description  INTERVENTIONS:  - Monitor WBC    Outcome: Progressing     Problem: SAFETY ADULT  Goal: Maintain or return to baseline ADL function  Description  INTERVENTIONS:  -  Assess patient's ability to carry out ADLs; assess patient's baseline for ADL function and identify physical deficits which impact ability to perform ADLs (bathing, care of mouth/teeth, toileting, grooming, dressing, etc )  - Assess/evaluate cause of self-care deficits   - Assess range of motion  - Assess patient's mobility; develop plan if impaired  - Assess patient's need for assistive devices and provide as appropriate  - Encourage maximum independence but intervene and supervise when necessary  - Involve family in performance of ADLs  - Assess for home care needs following discharge   - Consider OT consult to assist with ADL evaluation and planning for discharge  - Provide patient education as appropriate  Outcome: Progressing  Goal: Maintain or return mobility status to optimal level  Description  INTERVENTIONS:  - Assess patient's baseline mobility status (ambulation, transfers, stairs, etc )    - Identify cognitive and physical deficits and behaviors that affect mobility  - Identify mobility aids required to assist with transfers and/or ambulation (gait belt, sit-to-stand, lift, walker, cane, etc )  - [place] fall precautions as indicated by assessment  - Record patient progress and toleration of activity level on Mobility SBAR; progress patient to next Phase/Stage  - Instruct patient to call for assistance with activity based on assessment  - Consider rehabilitation consult to assist with strengthening/weightbearing, etc   Outcome: Progressing     Problem: DISCHARGE PLANNING  Goal: Discharge to home or other facility with appropriate resources  Description  INTERVENTIONS:  - Identify barriers to discharge w/patient and caregiver  - Arrange for needed discharge resources and transportation as appropriate  - Identify discharge learning needs (meds, wound care, etc )  - Arrange for interpretive services to assist at discharge as needed  - Refer to Case Management Department for coordinating discharge planning if the patient needs post-hospital services based on physician/advanced practitioner order or complex needs related to functional status, cognitive ability, or social support system  Outcome: Progressing

## 2020-01-18 NOTE — ASSESSMENT & PLAN NOTE
· Leucocytosis 12 71, present on admission, No source of infection  · Likely reactive to fracture  · Trending down  · Patient non-toxic looking, hemodynamically stable  · Trend CBC and temp curve for now  · Hold off on antibiiotics

## 2020-01-18 NOTE — ANESTHESIA PREPROCEDURE EVALUATION
Review of Systems/Medical History    Chart reviewed      Cardiovascular  EKG reviewed,    Pulmonary       GI/Hepatic            Endo/Other     GYN       Hematology   Musculoskeletal       Neurology   Psychology     Dementia  Comment: Severe dementia, oriented x 0         Physical Exam    Airway    Mallampati score: I  TM Distance: >3 FB  Neck ROM: full     Dental   upper dentures and lower dentures,     Cardiovascular  Cardiovascular exam normal    Pulmonary  Pulmonary exam normal     Other Findings        Anesthesia Plan  ASA Score- 3     Anesthesia Type- IV sedation with anesthesia and spinal with ASA Monitors  Additional Monitors:   Airway Plan:         Plan Factors-    Induction-     Postoperative Plan-     Informed Consent- Anesthetic plan and risks discussed with son

## 2020-01-19 LAB
ANION GAP SERPL CALCULATED.3IONS-SCNC: 7 MMOL/L (ref 4–13)
BASOPHILS # BLD AUTO: 0.04 THOUSANDS/ΜL (ref 0–0.1)
BASOPHILS NFR BLD AUTO: 0 % (ref 0–1)
BUN SERPL-MCNC: 17 MG/DL (ref 5–25)
CALCIUM SERPL-MCNC: 8.5 MG/DL (ref 8.3–10.1)
CHLORIDE SERPL-SCNC: 103 MMOL/L (ref 100–108)
CO2 SERPL-SCNC: 29 MMOL/L (ref 21–32)
CREAT SERPL-MCNC: 0.9 MG/DL (ref 0.6–1.3)
EOSINOPHIL # BLD AUTO: 0.2 THOUSAND/ΜL (ref 0–0.61)
EOSINOPHIL NFR BLD AUTO: 2 % (ref 0–6)
ERYTHROCYTE [DISTWIDTH] IN BLOOD BY AUTOMATED COUNT: 13.6 % (ref 11.6–15.1)
GFR SERPL CREATININE-BSD FRML MDRD: 59 ML/MIN/1.73SQ M
GLUCOSE SERPL-MCNC: 112 MG/DL (ref 65–140)
HCT VFR BLD AUTO: 31.3 % (ref 34.8–46.1)
HGB BLD-MCNC: 10 G/DL (ref 11.5–15.4)
IMM GRANULOCYTES # BLD AUTO: 0.07 THOUSAND/UL (ref 0–0.2)
IMM GRANULOCYTES NFR BLD AUTO: 1 % (ref 0–2)
LYMPHOCYTES # BLD AUTO: 2.4 THOUSANDS/ΜL (ref 0.6–4.47)
LYMPHOCYTES NFR BLD AUTO: 18 % (ref 14–44)
MCH RBC QN AUTO: 28.8 PG (ref 26.8–34.3)
MCHC RBC AUTO-ENTMCNC: 31.9 G/DL (ref 31.4–37.4)
MCV RBC AUTO: 90 FL (ref 82–98)
MONOCYTES # BLD AUTO: 1.26 THOUSAND/ΜL (ref 0.17–1.22)
MONOCYTES NFR BLD AUTO: 10 % (ref 4–12)
NEUTROPHILS # BLD AUTO: 9.29 THOUSANDS/ΜL (ref 1.85–7.62)
NEUTS SEG NFR BLD AUTO: 69 % (ref 43–75)
NRBC BLD AUTO-RTO: 0 /100 WBCS
PLATELET # BLD AUTO: 253 THOUSANDS/UL (ref 149–390)
PMV BLD AUTO: 11.2 FL (ref 8.9–12.7)
POTASSIUM SERPL-SCNC: 3.5 MMOL/L (ref 3.5–5.3)
RBC # BLD AUTO: 3.47 MILLION/UL (ref 3.81–5.12)
SODIUM SERPL-SCNC: 139 MMOL/L (ref 136–145)
WBC # BLD AUTO: 13.26 THOUSAND/UL (ref 4.31–10.16)

## 2020-01-19 PROCEDURE — 99024 POSTOP FOLLOW-UP VISIT: CPT | Performed by: PHYSICIAN ASSISTANT

## 2020-01-19 PROCEDURE — 99232 SBSQ HOSP IP/OBS MODERATE 35: CPT | Performed by: PHYSICIAN ASSISTANT

## 2020-01-19 PROCEDURE — 85025 COMPLETE CBC W/AUTO DIFF WBC: CPT | Performed by: ORTHOPAEDIC SURGERY

## 2020-01-19 PROCEDURE — 80048 BASIC METABOLIC PNL TOTAL CA: CPT | Performed by: ORTHOPAEDIC SURGERY

## 2020-01-19 RX ADMIN — ACETAMINOPHEN 650 MG: 325 TABLET ORAL at 23:35

## 2020-01-19 RX ADMIN — ACETAMINOPHEN 650 MG: 325 TABLET ORAL at 12:54

## 2020-01-19 RX ADMIN — ACETAMINOPHEN 650 MG: 325 TABLET ORAL at 18:12

## 2020-01-19 RX ADMIN — ACETAMINOPHEN 650 MG: 325 TABLET ORAL at 06:09

## 2020-01-19 RX ADMIN — ENOXAPARIN SODIUM 40 MG: 40 INJECTION SUBCUTANEOUS at 08:47

## 2020-01-19 RX ADMIN — OXYCODONE HYDROCHLORIDE 5 MG: 5 TABLET ORAL at 18:12

## 2020-01-19 NOTE — PLAN OF CARE
Problem: Potential for Falls  Goal: Patient will remain free of falls  Description  INTERVENTIONS:  - Assess patient frequently for physical needs  -  Identify cognitive and physical deficits and behaviors that affect risk of falls    -  [place] fall precautions as indicated by assessment   - Educate patient/family on patient safety including physical limitations  - Instruct patient to call for assistance with activity based on assessment  - Modify environment to reduce risk of injury  - Consider OT/PT consult to assist with strengthening/mobility  Outcome: Progressing

## 2020-01-19 NOTE — DISCHARGE INSTRUCTIONS
Orthopedics:    1  WBAT to RLE with assistance  2  Continue PT/OT  3  Pain medication as needed  4  Continue DVT prophylaxis as prescribed  Lovenox 40 mg daily for total of 2 weeks then  mg BID x 30 days  5  Initial mepilex dressing to stay on for one week  Then daily dressing change with gauze and tape  Do not shower until seen by surgeon in office  6  Follow up in office in 10-14 days

## 2020-01-19 NOTE — PROGRESS NOTES
[facility]  81 y o   female  MR#: [id number]  1/19/2020    Post-op days: 1  Extremity: right hip    Subjective:  Patient seen and examined  Lying comfortably in bed  No signs of distress at this time  Unable to answer questions coherently due to aphasia, but does follow simple commands  No events overnight  Vitals:   Vitals:    01/18/20 2228   BP: 152/82   Pulse: 72   Resp: 18   Temp: 99 4 °F (37 4 °C)   SpO2: 94%       Exam:   A&O x 3 NAD  Right hip:  Mepilex dressings c/d/i  Thigh and calf soft, compressible  Mild thigh swelling  Actively moving ankle and toes  DP 2+      Labs:   WBC   Recent Labs     01/17/20  1059 01/18/20  0515 01/19/20  0517   WBC 12 71* 11 25* 13 26*     H/H   Recent Labs     01/17/20  1059 01/18/20  0515 01/19/20  0517   HGB 12 0 11 0* 10 0*   /  Recent Labs     01/17/20  1059 01/18/20  0515 01/19/20  0517   HCT 38 5 34 1* 31 3*     Sed Rate No results for input(s): SEDRATE in the last 72 hours  CRP No results for input(s): CRP in the last 72 hours  Assessment:   S/p right greater trochanter rodding    Plan:   WBAT to RLE with assistance  PT/OT  Encourage incentive spirometry  Pain control as needed  DVT prophylaxis: Lovenox and SCD  ABLA- stable   Continue to monitor vitals and H/H  DC planning

## 2020-01-19 NOTE — PROGRESS NOTES
[facility]  Progress Note - Cammy Harris 1935, 81 y o  female MRN: [id number]  Unit/Bed#: -01 Encounter: [id number]  Primary Care Provider: Edel Torres DO   Date and time admitted to hospital: 1/17/2020 10:48 AM    * Closed intertrochanteric fracture of hip, right, initial encounter [facility])  Assessment & Plan  · S/P nail placement 1/18  · Ortho following  · Stable  · Preoperative Hb 11  · Postoperative Hb 10  · PT/OT pending    CKD (chronic kidney disease) stage 3, GFR 30-59 ml/min ([facility])  Assessment & Plan  · CKD 3, GFR 57 noted on admission  · No prior records to compare with  · Cr within normal limits, 0 90    Leucocytosis  Assessment & Plan  · Leucocytosis 12 71, present on admission, No source of infection  · Likely reactive to fracture  · Continue to trend  · Patient non-toxic looking, hemodynamically stable  · Trend CBC and temp curve for now  · Hold off on antibiiotics    Dementia ([facility] [address] )  Assessment & Plan  · History of dementia  · Supportive care, optimize sleep/wake cycles    Aphasia  Assessment & Plan  · Patient with documented history of possible aphasia, but she is able to talk albeit inappropriate answers  · Attempts to get more history from Son Tricia James) and Nursing home proved abortive      VTE Pharmacologic Prophylaxis:   Pharmacologic: Enoxaparin (Lovenox)  Mechanical VTE Prophylaxis in Place: No    Patient Centered Rounds: I have performed bedside rounds with nursing staff today  Discussions with Specialists or Other Care Team Provider: Appreciate input from ortho note    Education and Discussions with Family / Patient: Patient, spoke with son over the phone who confirms patient is aphasic at baseline, he will be available to discuss rehab options with case management tomorrow  Time Spent for Care: 20 minutes  More than 50% of total time spent on counseling and coordination of care as described above      Current Length of Stay: 2 day(s)    Current Patient Status: Inpatient   Certification Statement: The patient will continue to require additional inpatient hospital stay due to Postoperative care, pending PT/OT    Discharge Plan: Patient comes     Code Status: Level 1 - Full Code      Subjective:   Patient says "no" when asked if she is in pain or needs anything  Appears comfortable  Objective:     Vitals:   Temp (24hrs), Av 4 °F (36 9 °C), Min:97 6 °F (36 4 °C), Max:99 4 °F (37 4 °C)    Temp:  [97 6 °F (36 4 °C)-99 4 °F (37 4 °C)] 98 6 °F (37 °C)  HR:  [56-92] 64  Resp:  [12-20] 20  BP: (112-166)/(55-88) 124/68  SpO2:  [93 %-97 %] 96 %  Body mass index is 21 12 kg/m²  Input and Output Summary (last 24 hours): Intake/Output Summary (Last 24 hours) at 2020 0837  Last data filed at 2020 1820  Gross per 24 hour   Intake 640 ml   Output 358 ml   Net 282 ml       Physical Exam:     Physical Exam   Constitutional: She appears well-developed and well-nourished  No distress  HENT:   Head: Normocephalic and atraumatic  Eyes: Conjunctivae are normal  No scleral icterus  Cardiovascular: Normal rate and regular rhythm  No murmur heard  Pulmonary/Chest: Effort normal and breath sounds normal  No respiratory distress  She has no wheezes  She has no rales  Abdominal: Soft  She exhibits no distension  There is no tenderness  Musculoskeletal: She exhibits no edema  Right hip: She exhibits tenderness (over surgical site  clean, dry, intact)  Neurological: She is alert  Skin: Skin is warm and dry  No erythema  Psychiatric: Cognition and memory are impaired  aphasic   Nursing note and vitals reviewed      Additional Data:     Labs:    Results from last 7 days   Lab Units 20  0517   WBC Thousand/uL 13 26*   HEMOGLOBIN g/dL 10 0*   HEMATOCRIT % 31 3*   PLATELETS Thousands/uL 253   NEUTROS PCT % 69   LYMPHS PCT % 18   MONOS PCT % 10   EOS PCT % 2     Results from last 7 days   Lab Units 20  0517   SODIUM mmol/L 139 POTASSIUM mmol/L 3 5   CHLORIDE mmol/L 103   CO2 mmol/L 29   BUN mg/dL 17   CREATININE mg/dL 0 90   ANION GAP mmol/L 7   CALCIUM mg/dL 8 5   GLUCOSE RANDOM mg/dL 112     Results from last 7 days   Lab Units 01/17/20  1059   INR  1 07                       * I Have Reviewed All Lab Data Listed Above  * Additional Pertinent Lab Tests Reviewed: All Labs Within Last 24 Hours Reviewed    Imaging:    Imaging Reports Reviewed Today Include: None  Imaging Personally Reviewed by Myself Includes:  None    Recent Cultures (last 7 days):           Last 24 Hours Medication List:     Current Facility-Administered Medications:  acetaminophen 650 mg Oral Q6H [address] Paulette Lord MD   enoxaparin 40 mg Subcutaneous Daily Paulette Lord MD   ondansetron 4 mg Intravenous Q6H PRN Paulette Lord MD   oxyCODONE 2 5 mg Oral Q4H PRN Paulette Lord MD   Or       oxyCODONE 5 mg Oral Q4H PRN Paulette Lord MD        Today, Patient Was Seen By: Loree Tom PA-C    ** Please Note: Dictation voice to text software may have been used in the creation of this document   **

## 2020-01-19 NOTE — ASSESSMENT & PLAN NOTE
· Patient with documented history of possible aphasia, but she is able to talk albeit inappropriate answers  · Attempts to get more history from Son Katheryne Brunner) and Nursing home proved abortive

## 2020-01-19 NOTE — ASSESSMENT & PLAN NOTE
· CKD 3, GFR 57 noted on admission  · No prior records to compare with    · Cr within normal limits, 0 90

## 2020-01-19 NOTE — ASSESSMENT & PLAN NOTE
· S/P nail placement 1/18  · Ortho following  · Stable  · Preoperative Hb 11  · Postoperative Hb 10  · PT/OT pending

## 2020-01-19 NOTE — ASSESSMENT & PLAN NOTE
· Leucocytosis 12 71, present on admission, No source of infection  · Likely reactive to fracture  · Continue to trend  · Patient non-toxic looking, hemodynamically stable  · Trend CBC and temp curve for now  · Hold off on antibiiotics

## 2020-01-20 VITALS
OXYGEN SATURATION: 98 % | SYSTOLIC BLOOD PRESSURE: 132 MMHG | DIASTOLIC BLOOD PRESSURE: 90 MMHG | WEIGHT: 133.82 LBS | TEMPERATURE: 97.7 F | HEIGHT: 68 IN | HEART RATE: 78 BPM | BODY MASS INDEX: 20.28 KG/M2 | RESPIRATION RATE: 18 BRPM

## 2020-01-20 LAB
ANION GAP SERPL CALCULATED.3IONS-SCNC: 8 MMOL/L (ref 4–13)
BASOPHILS # BLD AUTO: 0.04 THOUSANDS/ΜL (ref 0–0.1)
BASOPHILS NFR BLD AUTO: 0 % (ref 0–1)
BUN SERPL-MCNC: 21 MG/DL (ref 5–25)
CALCIUM SERPL-MCNC: 8.5 MG/DL (ref 8.3–10.1)
CHLORIDE SERPL-SCNC: 103 MMOL/L (ref 100–108)
CO2 SERPL-SCNC: 29 MMOL/L (ref 21–32)
CREAT SERPL-MCNC: 0.91 MG/DL (ref 0.6–1.3)
EOSINOPHIL # BLD AUTO: 0.42 THOUSAND/ΜL (ref 0–0.61)
EOSINOPHIL NFR BLD AUTO: 3 % (ref 0–6)
ERYTHROCYTE [DISTWIDTH] IN BLOOD BY AUTOMATED COUNT: 13.7 % (ref 11.6–15.1)
GFR SERPL CREATININE-BSD FRML MDRD: 59 ML/MIN/1.73SQ M
GLUCOSE SERPL-MCNC: 104 MG/DL (ref 65–140)
HCT VFR BLD AUTO: 32.7 % (ref 34.8–46.1)
HGB BLD-MCNC: 10.2 G/DL (ref 11.5–15.4)
IMM GRANULOCYTES # BLD AUTO: 0.05 THOUSAND/UL (ref 0–0.2)
IMM GRANULOCYTES NFR BLD AUTO: 0 % (ref 0–2)
LYMPHOCYTES # BLD AUTO: 2.49 THOUSANDS/ΜL (ref 0.6–4.47)
LYMPHOCYTES NFR BLD AUTO: 19 % (ref 14–44)
MCH RBC QN AUTO: 29.3 PG (ref 26.8–34.3)
MCHC RBC AUTO-ENTMCNC: 31.2 G/DL (ref 31.4–37.4)
MCV RBC AUTO: 94 FL (ref 82–98)
MONOCYTES # BLD AUTO: 1.16 THOUSAND/ΜL (ref 0.17–1.22)
MONOCYTES NFR BLD AUTO: 9 % (ref 4–12)
NEUTROPHILS # BLD AUTO: 9.02 THOUSANDS/ΜL (ref 1.85–7.62)
NEUTS SEG NFR BLD AUTO: 69 % (ref 43–75)
NRBC BLD AUTO-RTO: 0 /100 WBCS
PLATELET # BLD AUTO: 260 THOUSANDS/UL (ref 149–390)
PMV BLD AUTO: 11 FL (ref 8.9–12.7)
POTASSIUM SERPL-SCNC: 3.6 MMOL/L (ref 3.5–5.3)
RBC # BLD AUTO: 3.48 MILLION/UL (ref 3.81–5.12)
SODIUM SERPL-SCNC: 140 MMOL/L (ref 136–145)
WBC # BLD AUTO: 13.18 THOUSAND/UL (ref 4.31–10.16)

## 2020-01-20 PROCEDURE — 97163 PT EVAL HIGH COMPLEX 45 MIN: CPT

## 2020-01-20 PROCEDURE — 85025 COMPLETE CBC W/AUTO DIFF WBC: CPT | Performed by: PHYSICIAN ASSISTANT

## 2020-01-20 PROCEDURE — 99239 HOSP IP/OBS DSCHRG MGMT >30: CPT | Performed by: PHYSICIAN ASSISTANT

## 2020-01-20 PROCEDURE — 80048 BASIC METABOLIC PNL TOTAL CA: CPT | Performed by: ORTHOPAEDIC SURGERY

## 2020-01-20 PROCEDURE — 97167 OT EVAL HIGH COMPLEX 60 MIN: CPT

## 2020-01-20 PROCEDURE — 99024 POSTOP FOLLOW-UP VISIT: CPT | Performed by: PHYSICIAN ASSISTANT

## 2020-01-20 RX ORDER — ACETAMINOPHEN 325 MG/1
650 TABLET ORAL EVERY 6 HOURS SCHEDULED
Qty: 30 TABLET | Refills: 0
Start: 2020-01-20 | End: 2020-01-24

## 2020-01-20 RX ORDER — ASPIRIN 325 MG
325 TABLET, DELAYED RELEASE (ENTERIC COATED) ORAL DAILY
Qty: 30 TABLET | Refills: 0
Start: 2020-02-02 | End: 2020-03-03

## 2020-01-20 RX ADMIN — ENOXAPARIN SODIUM 40 MG: 40 INJECTION SUBCUTANEOUS at 10:08

## 2020-01-20 RX ADMIN — ACETAMINOPHEN 650 MG: 325 TABLET ORAL at 05:31

## 2020-01-20 RX ADMIN — ACETAMINOPHEN 650 MG: 325 TABLET ORAL at 15:15

## 2020-01-20 NOTE — SOCIAL WORK
informed of Pt's discharge today  [facility]  Call placed to [facility], spoke with Leandro Huddleston, [facility] ambulance transport arranged to [facility]  Mukund Sis is 5:00pm  Call placed to [facility], spoke with [facility] in admissions, informed of transport time and discharge  Call placed to Pt's son(Shawn: [phone]), informed of discharge and transport time today  Pt's son in agreement  Pt's nurse informed of transport time

## 2020-01-20 NOTE — PLAN OF CARE
Problem: Potential for Falls  Goal: Patient will remain free of falls  Description  INTERVENTIONS:  - Assess patient frequently for physical needs  -  Identify cognitive and physical deficits and behaviors that affect risk of falls    -  [place] fall precautions as indicated by assessment   - Educate patient/family on patient safety including physical limitations  - Instruct patient to call for assistance with activity based on assessment  - Modify environment to reduce risk of injury  - Consider OT/PT consult to assist with strengthening/mobility  Outcome: Adequate for Discharge     Problem: Prexisting or High Potential for Compromised Skin Integrity  Goal: Skin integrity is maintained or improved  Description  INTERVENTIONS:  - Identify patients at risk for skin breakdown  - Assess and monitor skin integrity  - Assess and monitor nutrition and hydration status  - Monitor labs   - Assess for incontinence   - Turn and reposition patient  - Assist with mobility/ambulation  - Relieve pressure over bony prominences  - Avoid friction and shearing  - Provide appropriate hygiene as needed including keeping skin clean and dry  - Evaluate need for skin moisturizer/barrier cream  - Collaborate with interdisciplinary team   - Patient/family teaching  - Consider wound care consult   Outcome: Adequate for Discharge     Problem: PAIN - ADULT  Goal: Verbalizes/displays adequate comfort level or baseline comfort level  Description  Interventions:  - Encourage patient to monitor pain and request assistance  - Assess pain using appropriate pain scale  - Administer analgesics based on type and severity of pain and evaluate response  - Implement non-pharmacological measures as appropriate and evaluate response  - Consider cultural and social influences on pain and pain management  - Notify physician/advanced practitioner if interventions unsuccessful or patient reports new pain  Outcome: Adequate for Discharge     Problem: INFECTION - ADULT  Goal: Absence or prevention of progression during hospitalization  Description  INTERVENTIONS:  - Assess and monitor for signs and symptoms of infection  - Monitor lab/diagnostic results  - Monitor all insertion sites, i e  indwelling lines, tubes, and drains  - Monitor endotracheal if appropriate and nasal secretions for changes in amount and color  - [place] appropriate cooling/warming therapies per order  - Administer medications as ordered  - Instruct and encourage patient and family to use good hand hygiene technique  - Identify and instruct in appropriate isolation precautions for identified infection/condition  Outcome: Adequate for Discharge  Goal: Absence of fever/infection during neutropenic period  Description  INTERVENTIONS:  - Monitor WBC    Outcome: Adequate for Discharge     Problem: SAFETY ADULT  Goal: Maintain or return to baseline ADL function  Description  INTERVENTIONS:  -  Assess patient's ability to carry out ADLs; assess patient's baseline for ADL function and identify physical deficits which impact ability to perform ADLs (bathing, care of mouth/teeth, toileting, grooming, dressing, etc )  - Assess/evaluate cause of self-care deficits   - Assess range of motion  - Assess patient's mobility; develop plan if impaired  - Assess patient's need for assistive devices and provide as appropriate  - Encourage maximum independence but intervene and supervise when necessary  - Involve family in performance of ADLs  - Assess for home care needs following discharge   - Consider OT consult to assist with ADL evaluation and planning for discharge  - Provide patient education as appropriate  Outcome: Adequate for Discharge  Goal: Maintain or return mobility status to optimal level  Description  INTERVENTIONS:  - Assess patient's baseline mobility status (ambulation, transfers, stairs, etc )    - Identify cognitive and physical deficits and behaviors that affect mobility  - Identify mobility aids required to assist with transfers and/or ambulation (gait belt, sit-to-stand, lift, walker, cane, etc )  - [place] fall precautions as indicated by assessment  - Record patient progress and toleration of activity level on Mobility SBAR; progress patient to next Phase/Stage  - Instruct patient to call for assistance with activity based on assessment  - Consider rehabilitation consult to assist with strengthening/weightbearing, etc   Outcome: Adequate for Discharge     Problem: DISCHARGE PLANNING  Goal: Discharge to home or other facility with appropriate resources  Description  INTERVENTIONS:  - Identify barriers to discharge w/patient and caregiver  - Arrange for needed discharge resources and transportation as appropriate  - Identify discharge learning needs (meds, wound care, etc )  - Arrange for interpretive services to assist at discharge as needed  - Refer to Case Management Department for coordinating discharge planning if the patient needs post-hospital services based on physician/advanced practitioner order or complex needs related to functional status, cognitive ability, or social support system  Outcome: Adequate for Discharge

## 2020-01-20 NOTE — TRANSPORTATION MEDICAL NECESSITY
Section I - General Information    Name of Patient: Amrita Zarate                 : 1935    Medicare #: [id number]  Transport Date: 20 (PCS is valid for round trips on this date and for all repetitive trips in the 60-day range as noted below )  Origin: [address] [place] [address]: [facility]  Is the pt's stay covered under Medicare Part A (PPS/DRG)   [x]     Closest appropriate facility? If no, why is transport to more distant facility required? Yes  If hospice pt, is this transport related to pt's terminal illness? NA       Section II - Medical Necessity Questionnaire  Ambulance transportation is medically necessary only if other means of transport are contraindicated or would be potentially harmful to the patient  To meet this requirement, the patient must either be "bed confined" or suffer from a condition such that transport by means other than ambulance is contraindicated by the patient's condition  The following questions must be answered by the medical professional signing below for this form to be valid:    1)  Describe the MEDICAL CONDITION (physical and/or mental) of this patient AT [address] [place] [address] that requires the patient to be transported in an ambulance and why transport by other means is contraindicated by the patient's condition:  Right hip fracture, max assist x2 for tranfers/ ambulation, poor sitting balance, fall risk    2) Is the patient "bed confined" as defined below? No  To be "be confined" the patient must satisfy all three of the following conditions: (1) unable to get up from bed without Assistance; AND (2) unable to ambulate; AND (3) unable to sit in a chair or wheelchair  3) Can this patient safely be transported by car or wheelchair van (i e , seated during transport without a medical attendant or monitoring)?   No    4) In addition to completing questions 1-3 above, please check any of the following conditions that apply*:   *Note: supporting documentation for any boxes checked must be maintained in the patient's medical records  If hosp-hosp transfer, describe services needed at 2nd facility not available at 1st facility? Non-Healed Fractures  Patient is confused      Section III - Signature of Physician or Healthcare Professional  I certify that the above information is true and correct based on my evaluation of this patient, and represent that the patient requires transport by ambulance and that other forms of transport are contraindicated  I understand that this information will be used by the Centers for Medicare and Medicaid Services (CMS) to support the determination of medical necessity for ambulance services, and I represent that I have personal knowledge of the patient's condition at time of transport  []  If this box is checked, I also certify that the patient is physically or mentally incapable of signing the ambulance service's claim and that the institution with which I am affiliated has furnished care, services, or assistance to the patient  My signature below is made on behalf of the patient pursuant to 42 CFR §424 36(b)(4)  In accordance with 42 CFR §424 37, the specific reason(s) that the patient is physically or mentally incapable of signing the claim form is as follows:       Signature of Physician* or Healthcare Professional______________________________________________________________  Signature Date 01/20/20 (For scheduled repetitive transports, this form is not valid for transports performed more than 60 days after this date)    Printed Name & Credentials of Physician or Healthcare Professional (MD, DO, RN, etc )________________________________  *Form must be signed by patient's attending physician for scheduled, repetitive transports   For non-repetitive, unscheduled ambulance transports, if unable to obtain the signature of the attending physician, any of the following may sign (choose appropriate option below)  [] Physician Assistant []  Clinical Nurse Specialist []  Registered Nurse  []  Nurse Practitioner  [] Discharge Planner

## 2020-01-20 NOTE — PROGRESS NOTES
Progress Note - Orthopedics   Marcela Garcia 81 y o  female MRN: [id number]  Unit/Bed#: -01 Encounter: [id number]    Assessment:  45-year-old female postop day 2 right TFN    Plan:  -weight-bearing as tolerated to right lower extremity with assistive device  -PT OT  -SCDs and Lovenox for DVT prophylaxis  -pain control p r n   - H&H reviewed this morning are stable  -discharge planning      Subjective:  45-year-old female 2 days status post right TFN  Patient was seen examined at bedside  Due to dementia and aphasia patient was unable to answer questions  She is resting comfortably in bed  Denied pain in the hip  Vitals: Blood pressure 141/65, pulse 57, temperature 98 1 °F (36 7 °C), temperature source Oral, resp  rate 18, height 5' 8" (1 727 m), weight 60 7 kg (133 lb 13 1 oz), SpO2 96 %  ,Body mass index is 20 35 kg/m²  Intake/Output Summary (Last 24 hours) at 1/20/2020 8392  Last data filed at 1/19/2020 1810  Gross per 24 hour   Intake 120 ml   Output    Net 120 ml       Invasive Devices     Peripheral Intravenous Line            Peripheral IV 01/17/20 Right Antecubital 2 days                Physical Exam: General appearance: alert and oriented, in no acute distress  Head: Normocephalic, without obvious abnormality, atraumatic  Eyes: conjunctivae/corneas clear  PERRL, EOM's intact  Fundi benign  Lungs: No stridor or wheezing  Heart: No apparent distress  Ortho Exam:   Right Lower Extremity Exam  Alignment:  Normal resting hip posture  Inspection:  Mepilex dressings were clean dry and intact without breakthrough bleeding or surrounding erythema  Palpation:  Tenderness to palpation over incision sites, compartment soft and compressible  Strength:  EHL 5/5  FHL 5/5  Neurovascular:  Sensation intact in DP/SP/Estrada/Sa/T nerve distributions  2+ DP & PT pulses  Lab, Imaging and other studies:   I have personally reviewed pertinent lab results    CBC:   Lab Results   Component Value Date    WBC 13 18 (H) 01/20/2020    HGB 10 2 (L) 01/20/2020    HCT 32 7 (L) 01/20/2020    MCV 94 01/20/2020     01/20/2020    MCH 29 3 01/20/2020    MCHC 31 2 (L) 01/20/2020    RDW 13 7 01/20/2020    MPV 11 0 01/20/2020    NRBC 0 01/20/2020     CMP:   Lab Results   Component Value Date    SODIUM 140 01/20/2020     01/20/2020    CO2 29 01/20/2020    BUN 21 01/20/2020    CREATININE 0 91 01/20/2020    CALCIUM 8 5 01/20/2020    EGFR 59 01/20/2020     Jennifer Nava PA-C

## 2020-01-20 NOTE — DISCHARGE SUMMARY
[facility]  Discharge- Rodolfoki Robles 1935, 81 y o  female MRN: [id number]  Unit/Bed#: -Dino Encounter: [id number]  Primary Care Provider: Salvatore Angela DO   Date and time admitted to hospital: 1/17/2020 10:48 AM    * Closed intertrochanteric fracture of hip, right, initial encounter [facility])  Assessment & Plan  · S/P nail placement 1/18  · Ortho following  · Stable  · Preoperative Hb 11  · Postoperative Hb 10  · Now 10 2, stable  · PT/OT recommending return to facility     CKD (chronic kidney disease) stage 3, GFR 30-59 ml/min ([facility])  Assessment & Plan  · CKD 3, GFR 57 noted on admission  · No prior records to compare with  · Cr within normal limits, 0 91    Leucocytosis  Assessment & Plan  · Leucocytosis 12 71, present on admission, No source of infection  · Likely reactive to fracture  · Continue to trend  · Patient non-toxic looking, hemodynamically stable  · Trend CBC and temp curve for now  · Hold off on antibiiotics    Dementia ([facility] [address] )  Assessment & Plan  · History of dementia  · Supportive care, optimize sleep/wake cycles    Aphasia  Assessment & Plan  · Patient with documented history of possible aphasia, but she is able to talk albeit inappropriate answers  · Attempts to get more history from Son Veda Ling) and Nursing home proved abortive      Discharging Physician / Practitioner: Keila Jarrett PA-C  PCP: Salvatore Angela DO  Admission Date:   Admission Orders (From admission, onward)     Ordered        01/17/20 1204  Inpatient Admission (expected length of stay for this patient Order details is greater than two midnights)  Once                   Discharge Date: 01/20/20    Resolved Problems  Date Reviewed: 1/20/2020    None          Consultations During Hospital Stay:  · Orthopedics    Procedures Performed:   · Insertion nail IM femur antegrade, R 1/18    Significant Findings / Test Results:   · CXR 1/17: Limited   No focal airspace consolidation  · Xray 1/17: Acute right femoral neck/intertrochanteric fracture with displacement of the lesser trochanter noted  · Xray 1/17: No acute osseous abnormality  Incidental Findings:   · None     Test Results Pending at Discharge (will require follow up): · None     Outpatient Tests Requested:  · None    Complications:  None    Reason for Admission: [facility] Course:     Reed Amado is a 81 y o  female patient with a PMH of dementia who originally presented to the hospital on 1/17/2020 due to witnessed fall at [facility]  Patient denied hitting her head or loosing consciousness  Trauma evaluation revealed acute R femoral neck/intertrochanteric fracture with displacement  Orthopedics was consulted and performed repair on 1/18  PT/OT evaluated patient postoperatively  Hemoglobin remained stable postoperatively  Patient is appropriate for returned to [facility]  Patient appears at baseline  Please see above list of diagnoses and related plan for additional information  Condition at Discharge: stable     Discharge Day Visit / Exam:     Subjective:  Patient denies pain or complication  Minimal tenderness over surgical incision  More interactive today  Vitals: Blood Pressure: 141/65 (01/20/20 0700)  Pulse: 57 (01/20/20 0700)  Temperature: 98 1 °F (36 7 °C) (01/20/20 0700)  Temp Source: Oral (01/20/20 0700)  Respirations: 18 (01/20/20 0700)  Height: 5' 8" (172 7 cm) (01/17/20 1046)  Weight - Scale: 60 7 kg (133 lb 13 1 oz) (01/20/20 0600)  SpO2: 96 % (01/20/20 0700)  Exam:   Physical Exam   Constitutional: She appears well-developed and well-nourished  No distress  HENT:   Head: Normocephalic and atraumatic  Eyes: Conjunctivae are normal  No scleral icterus  Cardiovascular: Normal rate and regular rhythm  No murmur heard  Pulmonary/Chest: Effort normal and breath sounds normal  No respiratory distress  She has no wheezes  She has no rales  Abdominal: Soft  She exhibits no distension   There is no tenderness  Musculoskeletal: She exhibits no edema  Right hip: She exhibits tenderness (Over surgical incision clean, dry, intact, soft)  Neurological: She is alert  Skin: Skin is warm and dry  No erythema  Psychiatric:   Aphasic at baseline, fragmented speaking patterns  Nursing note and vitals reviewed  Discussion with Family:  Appreciate case management discussion with patient's son  Discharge instructions/Information to patient and family:   See after visit summary for information provided to patient and family  Provisions for Follow-Up Care:  See after visit summary for information related to follow-up care and any pertinent home health orders  Disposition:     [address] at [facility] to [facility] SNF:   · Not Applicable to this Patient - Not Applicable to this Patient    Planned Readmission: None     Discharge Statement:  I spent 65 minutes discharging the patient  This time was spent on the day of discharge  I had direct contact with the patient on the day of discharge  Greater than 50% of the total time was spent examining patient, answering all patient questions, arranging and discussing plan of care with patient as well as directly providing post-discharge instructions  Additional time then spent on discharge activities  Discharge Medications:  See after visit summary for reconciled discharge medications provided to patient and family        ** Please Note: This note has been constructed using a voice recognition system **

## 2020-01-20 NOTE — PHYSICAL THERAPY NOTE
PHYSICAL THERAPY EVAL       01/20/20 1110   Note Type   Note type Eval only   Pain Assessment   Pain Assessment Jiménez-Baker FACES   Jiménez-Baker FACES Pain Rating 2   Pain Type Acute pain   Pain Location Hip   Pain Orientation Right   Home Living   Type of Home SNF  ([facility])   Additional Comments Unable to obtain due to aphasia   Prior Function   Comments Unable to obtain due to aphasia   Restrictions/Precautions   Weight Bearing Precautions Per Order Yes   RLE Weight Bearing Per Order WBAT   General   Family/Caregiver Present No   Cognition   Overall Cognitive Status Impaired   Orientation Level Oriented to person   Following Commands Follows one step commands inconsistently   Comments Apahsic   RLE Assessment   RLE Assessment   (at least 3/5)   LLE Assessment   LLE Assessment   (at least 3/5)   Bed Mobility   Supine to Sit 2  Maximal assistance   Additional items Assist x 2;HOB elevated; Bedrails; Increased time required;Verbal cues;LE management   Additional Comments OOB in chair with chair alarm on, scd's on, legs elevated and call bell within reach   Transfers   Sit to Stand 2  Maximal assistance   Additional items Assist x 2;Armrests; Increased time required   Stand to Sit 2  Maximal assistance   Additional items Assist x 2;Armrests; Increased time required;Verbal cues   Ambulation/Elevation   Gait pattern Foward flexed;Decreased R stance; Antalgic;Narrow DEANDRE   Gait Assistance 2  Maximal assist   Additional items Assist x 2;Verbal cues; Tactile cues   Assistive Device None  (HHA)   Distance 3ft  (bed to chair)   Balance   Static Sitting Fair   Dynamic Sitting Fair -   Static Standing Poor +   Dynamic Standing Poor   Ambulatory Poor -   Endurance Deficit   Endurance Deficit Yes   Activity Tolerance   Activity Tolerance Patient limited by pain  (cognition)   Medical Staff Made Aware  [facility]   Nurse Made Aware RN   Assessment Prognosis Fair   Problem List Decreased strength;Decreased range of motion;Decreased endurance; Impaired balance;Decreased mobility; Decreased cognition; Impaired judgement;Decreased safety awareness;Pain;Orthopedic restrictions   Assessment Patient is a 82y/o F who presented with a R hip fracture now s/p ORIF 1/18  WBAT  PMH significant for CKD, dementia, aphasia, thyroid disease  Patient resides at a SNF  Unclear PLOF as she is aphasic  Current medical status includes poor cognition, poor safety awareness, aphasia, bed/chair alarm, fall risk, pain, decreased strength, ROM, balance, endurance and mobility  Patient required assist of 2 for bed mobility, transfers and amb  Decreased weight bearing on the RLE due to pain  Increased time to complete all tasks due to difficulty following direction  Patient will benefit from rehab at discharge  Goals   Patient Goals None stated   STG Expiration Date 02/03/20   Short Term Goal #1 1  perform supine<>sit with HOB elevated and use of bedrails mod A x 1 2  Perform sit<>stand mod A x 1 3  Amb 25ft with Mod A x 1 and use of RW   PT Treatment Day 0   Plan   Treatment/Interventions Functional transfer training;LE strengthening/ROM; Therapeutic exercise; Endurance training;Cognitive reorientation;Equipment eval/education; Bed mobility;Gait training;Spoke to nursing;OT   PT Frequency 5x/wk   Recommendation   Recommendation Short-term skilled PT   Equipment Recommended Wheelchair;Walker   Modified Noa Scale   Modified Grapevine Scale 4   Evelyn Giraldo, PT            Patient Name: Hannah AKHTAR Date: 1/20/2020

## 2020-01-20 NOTE — PLAN OF CARE
Problem: PHYSICAL THERAPY ADULT  Goal: Performs mobility at highest level of function for planned discharge setting  See evaluation for individualized goals  Description  Treatment/Interventions: Functional transfer training, LE strengthening/ROM, Therapeutic exercise, Endurance training, Cognitive reorientation, Equipment eval/education, Bed mobility, Gait training, Spoke to nursing, OT  Equipment Recommended: Wheelchair, Mat Prader       See flowsheet documentation for full assessment, interventions and recommendations  Note:   Prognosis: Fair  Problem List: Decreased strength, Decreased range of motion, Decreased endurance, Impaired balance, Decreased mobility, Decreased cognition, Impaired judgement, Decreased safety awareness, Pain, Orthopedic restrictions  Assessment: Patient is a 80y/o F who presented with a R hip fracture now s/p ORIF 1/18  WBAT  PMH significant for CKD, dementia, aphasia, thyroid disease  Patient resides at a SNF  Unclear PLOF as she is aphasic  Current medical status includes poor cognition, poor safety awareness, aphasia, bed/chair alarm, fall risk, pain, decreased strength, ROM, balance, endurance and mobility  Patient required assist of 2 for bed mobility, transfers and amb  Decreased weight bearing on the RLE due to pain  Increased time to complete all tasks due to difficulty following direction  Patient will benefit from rehab at discharge  Recommendation: Short-term skilled PT          See flowsheet documentation for full assessment

## 2020-01-20 NOTE — PLAN OF CARE
Problem: OCCUPATIONAL THERAPY ADULT  Goal: Performs self-care activities at highest level of function for planned discharge setting  See evaluation for individualized goals  Description  Treatment Interventions: ADL retraining, Functional transfer training, Endurance training, Cognitive reorientation, Compensatory technique education, Energy conservation, Patient/family training, Equipment evaluation/education          See flowsheet documentation for full assessment, interventions and recommendations  Note:   Limitation: Decreased ADL status, Decreased self-care trans, Decreased cognition, Decreased endurance, Decreased Safe judgement during ADL, Decreased high-level ADLs  Prognosis: Fair  Assessment: Pt is a 81 y o  female seen for OT evaluation at [facility], admitted 1/17/2020 w/ Closed intertrochanteric fracture of hip, right, initial encounter ([facility] [address] )  OT completed extensive review of pt's medical and social history  Comorbidities affecting pt's functional performance at time of assessment include: aphasia, dementia, leucocytosis, and CKD  Personal factors affecting pt at time of IE include:difficulty performing ADLS, difficulty performing IADLS , limited insight into deficits and decreased functional mobility   Prior to admission, pt was [facility] at [facility]  Pt unable to provide details regarding baseline functional status  Upon evaluation, pt presents to OT below baseline due to the following performance deficits: decreased strength, decreased balance, decreased tolerance, impaired attention, impaired initiation, impaired memory, impaired problem solving, decreased safety awareness and increased pain  Pt to benefit from continued skilled OT tx while in the hospital to address deficits as defined above and maximize level of functional independence w ADL's and functional mobility   Occupational Performance areas to address include: eating, grooming, bathing/shower, toilet hygiene, dressing, health maintenance, functional mobility and community mobility  Based on findings, pt is of high complexity  At this time, OT recommendations at time of discharge are short term rehab       OT Discharge Recommendation: Short Term Rehab

## 2020-01-20 NOTE — ASSESSMENT & PLAN NOTE
· CKD 3, GFR 57 noted on admission  · No prior records to compare with    · Cr within normal limits, 0 91

## 2020-01-20 NOTE — PLAN OF CARE
Problem: Potential for Falls  Goal: Patient will remain free of falls  Description  INTERVENTIONS:  - Assess patient frequently for physical needs  -  Identify cognitive and physical deficits and behaviors that affect risk of falls    -  [place] fall precautions as indicated by assessment   - Educate patient/family on patient safety including physical limitations  - Instruct patient to call for assistance with activity based on assessment  - Modify environment to reduce risk of injury  - Consider OT/PT consult to assist with strengthening/mobility  Outcome: Progressing     Problem: Prexisting or High Potential for Compromised Skin Integrity  Goal: Skin integrity is maintained or improved  Description  INTERVENTIONS:  - Identify patients at risk for skin breakdown  - Assess and monitor skin integrity  - Assess and monitor nutrition and hydration status  - Monitor labs   - Assess for incontinence   - Turn and reposition patient  - Assist with mobility/ambulation  - Relieve pressure over bony prominences  - Avoid friction and shearing  - Provide appropriate hygiene as needed including keeping skin clean and dry  - Evaluate need for skin moisturizer/barrier cream  - Collaborate with interdisciplinary team   - Patient/family teaching  - Consider wound care consult   Outcome: Progressing     Problem: PAIN - ADULT  Goal: Verbalizes/displays adequate comfort level or baseline comfort level  Description  Interventions:  - Encourage patient to monitor pain and request assistance  - Assess pain using appropriate pain scale  - Administer analgesics based on type and severity of pain and evaluate response  - Implement non-pharmacological measures as appropriate and evaluate response  - Consider cultural and social influences on pain and pain management  - Notify physician/advanced practitioner if interventions unsuccessful or patient reports new pain  Outcome: Progressing     Problem: INFECTION - ADULT  Goal: Absence or prevention of progression during hospitalization  Description  INTERVENTIONS:  - Assess and monitor for signs and symptoms of infection  - Monitor lab/diagnostic results  - Monitor all insertion sites, i e  indwelling lines, tubes, and drains  - Monitor endotracheal if appropriate and nasal secretions for changes in amount and color  - [place] appropriate cooling/warming therapies per order  - Administer medications as ordered  - Instruct and encourage patient and family to use good hand hygiene technique  - Identify and instruct in appropriate isolation precautions for identified infection/condition  Outcome: Progressing  Goal: Absence of fever/infection during neutropenic period  Description  INTERVENTIONS:  - Monitor WBC    Outcome: Progressing     Problem: SAFETY ADULT  Goal: Maintain or return to baseline ADL function  Description  INTERVENTIONS:  -  Assess patient's ability to carry out ADLs; assess patient's baseline for ADL function and identify physical deficits which impact ability to perform ADLs (bathing, care of mouth/teeth, toileting, grooming, dressing, etc )  - Assess/evaluate cause of self-care deficits   - Assess range of motion  - Assess patient's mobility; develop plan if impaired  - Assess patient's need for assistive devices and provide as appropriate  - Encourage maximum independence but intervene and supervise when necessary  - Involve family in performance of ADLs  - Assess for home care needs following discharge   - Consider OT consult to assist with ADL evaluation and planning for discharge  - Provide patient education as appropriate  Outcome: Progressing  Goal: Maintain or return mobility status to optimal level  Description  INTERVENTIONS:  - Assess patient's baseline mobility status (ambulation, transfers, stairs, etc )    - Identify cognitive and physical deficits and behaviors that affect mobility  - Identify mobility aids required to assist with transfers and/or ambulation (gait belt, sit-to-stand, lift, walker, cane, etc )  - [place] fall precautions as indicated by assessment  - Record patient progress and toleration of activity level on Mobility SBAR; progress patient to next Phase/Stage  - Instruct patient to call for assistance with activity based on assessment  - Consider rehabilitation consult to assist with strengthening/weightbearing, etc   Outcome: Progressing     Problem: DISCHARGE PLANNING  Goal: Discharge to home or other facility with appropriate resources  Description  INTERVENTIONS:  - Identify barriers to discharge w/patient and caregiver  - Arrange for needed discharge resources and transportation as appropriate  - Identify discharge learning needs (meds, wound care, etc )  - Arrange for interpretive services to assist at discharge as needed  - Refer to Case Management Department for coordinating discharge planning if the patient needs post-hospital services based on physician/advanced practitioner order or complex needs related to functional status, cognitive ability, or social support system  Outcome: Progressing

## 2020-01-20 NOTE — ASSESSMENT & PLAN NOTE
· S/P nail placement 1/18  · Ortho following  · Stable  · Preoperative Hb 11  · Postoperative Hb 10  · Now 10 2, stable  · PT/OT recommending return to facility

## 2020-01-20 NOTE — ASSESSMENT & PLAN NOTE
· Patient with documented history of possible aphasia, but she is able to talk albeit inappropriate answers  · Attempts to get more history from [facility]) and Nursing home proved abortive

## 2020-01-20 NOTE — OCCUPATIONAL THERAPY NOTE
[address] Evaluation     Patient Name: Clay Zurita  [id number] Date: 1/20/2020  Problem List  Principal Problem:    Closed intertrochanteric fracture of hip, right, initial encounter [facility])  Active Problems:    Aphasia    Dementia ([facility] [address] )    Leucocytosis    CKD (chronic kidney disease) stage 3, GFR 30-59 ml/min ([facility])    Past Medical History  Past Medical History:   Diagnosis Date    Aphasia     Dementia ([facility] [address] )     Disease of thyroid gland      Past Surgical History  History reviewed  No pertinent surgical history  01/20/20 1111   Note Type   Note type Eval only   Restrictions/Precautions   Weight Bearing Precautions Per Order Yes   RLE Weight Bearing Per Order WBAT   Pain Assessment   Pain Assessment Jiménez-Baker FACES   Jiménez-Baker FACES Pain Rating 2   Pain Type Acute pain   Pain Location Hip   Pain Orientation Right   Home Living   Type of Home [facility])   Additional Comments Pt aphasic and unable to provide any details regarding social hx  Prior Function   Comments Pt aphasic and unable to provide any details regarding social hx  Psychosocial   Psychosocial (WDL) WDL   ADL   Eating Assistance 5  Supervision/Setup   Grooming Assistance 3  Moderate Assistance   UB Bathing Assistance 3  Moderate Assistance   LB Bathing Assistance 2  Maximal Assistance   UB Dressing Assistance 3  Moderate Assistance   LB Dressing Assistance 2  Maximal [address]  2  Maximal Assistance   Bed Mobility   Supine to Sit 2  Maximal assistance   Additional items Assist x 2;HOB elevated   Sit to Supine   (Pt remained seated in chair )   Transfers   Sit to Stand 2  Maximal assistance   Additional items Assist x 2;Verbal cues;Armrests  (RW)   Stand to Sit 2  Maximal assistance   Additional items Assist x 2;Armrests; Verbal cues  (RW)   Stand pivot 2  Maximal assistance   Additional items Assist x 2;Verbal cues  (RW)   Balance   Static Sitting Fair   Dynamic Sitting Fair -   Static Standing Poor +   Dynamic Standing Poor   Activity Tolerance   Activity Tolerance Patient limited by pain   Medical Staff Made Aware  [address]    Nurse Made Aware PATRICA Murdock   RUE Assessment   RUE Assessment WFL   LUE Assessment   LUE Assessment WFL   Cognition   Overall Cognitive Status Impaired   Arousal/Participation Alert   Attention Attends with cues to redirect   Orientation Level Oriented to person   Memory Unable to assess  (Pt aphasic)   Following Commands Follows one step commands with increased time or repetition   Comments Pt aphasic   Assessment   Limitation Decreased ADL status; Decreased self-care trans;Decreased cognition;Decreased endurance;Decreased Safe judgement during ADL;Decreased high-level ADLs   Prognosis Fair   Assessment Pt is a 81 y o  female seen for OT evaluation at [facility], admitted 1/17/2020 w/ Closed intertrochanteric fracture of hip, right, initial encounter ([facility] [place] [address] )  OT completed extensive review of pt's medical and social history  Comorbidities affecting pt's functional performance at time of assessment include: aphasia, dementia, leucocytosis, and CKD  Personal factors affecting pt at time of IE include:difficulty performing ADLS, difficulty performing IADLS , limited insight into deficits and decreased functional mobility   Prior to admission, pt was living at [facility]  Pt unable to provide details regarding baseline functional status  Upon evaluation, pt presents to OT below baseline due to the following performance deficits: decreased strength, decreased balance, decreased tolerance, impaired attention, impaired initiation, impaired memory, impaired problem solving, decreased safety awareness and increased pain  Pt to benefit from continued skilled OT tx while in the hospital to address deficits as defined above and maximize level of functional independence w ADL's and functional mobility   Occupational Performance areas to address include: eating, grooming, bathing/shower, toilet hygiene, dressing, health maintenance, functional mobility and community mobility  Based on findings, pt is of high complexity  At this time, OT recommendations at time of discharge are short term rehab  Goals   Patient Goals Pt unable to verbalize goals/   Plan   Treatment Interventions ADL retraining;Functional transfer training; Endurance training;Cognitive reorientation; Compensatory technique education; Energy conservation;Patient/family training;Equipment evaluation/education   Goal Expiration Date 01/30/20   OT Treatment Day 0   OT Frequency 2-3x/wk   Recommendation   OT Discharge Recommendation Short Term Rehab         Pt will achieve the following goals within 10 days  *Pt will complete grooming with supervision  *Pt will complete UB bathing and dressing with supervision  *Pt will complete LB bathing and dressing with min A      *Pt will complete toileting (hygiene and clothing management) with min A  using DME as needed  *Pt will complete bed mobility with min A , with bed flat and no side rail to prep for purposeful tasks    *Pt will perform functional transfers with on/off all surfaces with min A using DME as needed w/ G balance/safety  *Pt will increase standing tolerance to 5 minutes in order to complete sinkside ADL task  *Pt will demonstrate increased activity tolerance in order to complete ADL routine  *Pt will sit on EOB for 10 minutes for increased safety with seated activity tolerance during ADL tasks      Eddie Ho OTR/L

## 2020-01-31 ENCOUNTER — APPOINTMENT (OUTPATIENT)
Dept: RADIOLOGY | Facility: CLINIC | Age: 85
End: 2020-01-31
Payer: COMMERCIAL

## 2020-01-31 ENCOUNTER — OFFICE VISIT (OUTPATIENT)
Dept: OBGYN CLINIC | Facility: CLINIC | Age: 85
End: 2020-01-31

## 2020-01-31 VITALS
WEIGHT: 116 LBS | DIASTOLIC BLOOD PRESSURE: 68 MMHG | BODY MASS INDEX: 17.64 KG/M2 | HEART RATE: 72 BPM | SYSTOLIC BLOOD PRESSURE: 130 MMHG

## 2020-01-31 DIAGNOSIS — Z47.89 AFTERCARE FOLLOWING SURGERY OF THE MUSCULOSKELETAL SYSTEM: Primary | ICD-10-CM

## 2020-01-31 DIAGNOSIS — Z47.89 AFTERCARE FOLLOWING SURGERY OF THE MUSCULOSKELETAL SYSTEM: ICD-10-CM

## 2020-01-31 PROCEDURE — 99024 POSTOP FOLLOW-UP VISIT: CPT | Performed by: ORTHOPAEDIC SURGERY

## 2020-01-31 PROCEDURE — 73502 X-RAY EXAM HIP UNI 2-3 VIEWS: CPT

## 2020-01-31 NOTE — PROGRESS NOTES
Assessment:     1  Aftercare following surgery of the musculoskeletal system        Plan:  The patient was seen and examined by Dr Stephania Do and myself  Problem List Items Addressed This Visit        Other    Aftercare following surgery of the musculoskeletal system - Primary     Patient is doing well status post right hip greater trochanteric rodding  X-rays reviewed with the patient  Patient does not communicate well due to aphasia  Continue weight-bearing as tolerated to right lower extremity with assistance  Continue treatment with physical therapy  Follow-up in 8 weeks with repeat x-rays  All questions were answered to patient's satisfaction  Relevant Orders    XR hip/pelv 2-3 vws left if performed    XR hip/pelv 2-3 vws right if performed         Subjective:     Patient ID: Rodolfo Robles is a 81 y o  female  Chief Complaint: This is an 78-year-old white female who is status post right hip greater trochanter rodding on January 18, 2020  She is currently residing in a rehab facility  She cannot communicate well due to having aphasia  A caretaker from the rehab facility is present  She arrives in a stretcher  Allergy:  No Known Allergies  Medications:  all current active meds have been reviewed  Past Medical History:  Past Medical History:   Diagnosis Date    Aphasia     Dementia ([facility] [address] )     Disease of thyroid gland      Past Surgical History:  Past Surgical History:   Procedure Laterality Date    [place] OPEN RX FEMUR FX+INTRAMED YUN Right 1/18/2020    Procedure: INSERTION NAIL IM FEMUR ANTEGRADE (TROCHANTERIC), RIGHT;  Surgeon: Lucila Isidro MD;  Location: [facility];  Service: Orthopedics     Family History:  History reviewed  No pertinent family history    Social History:  Social History     Substance and Sexual Activity   Alcohol Use Not Currently     Social History     Substance and Sexual Activity   Drug Use Never     Social History     Tobacco Use   Smoking Status Former Smoker Review of Systems   Unable to perform ROS: Patient nonverbal         Objective:  BP Readings from Last 1 Encounters:   01/31/20 130/68      Wt Readings from Last 1 Encounters:   01/31/20 52 6 kg (116 lb)      BMI:   Estimated body mass index is 17 64 kg/m² as calculated from the following:    Height as of 1/17/20: 5' 8" (1 727 m)  Weight as of this encounter: 52 6 kg (116 lb)  BSA:   Estimated body surface area is 1 62 meters squared as calculated from the following:    Height as of 1/17/20: 5' 8" (1 727 m)  Weight as of this encounter: 52 6 kg (116 lb)  Physical Exam   Constitutional: She is oriented to person, place, and time  She appears well-developed  HENT:   Head: Normocephalic and atraumatic  Eyes: Conjunctivae and EOM are normal    Neck: Neck supple  Neurological: She is alert and oriented to person, place, and time  Skin: Skin is warm  Psychiatric: She has a normal mood and affect  Nursing note and vitals reviewed  Right Hip Exam     Other   Erythema: absent  Scars: present (Healing incisions with no evidence of infection  No active drainage )  Sensation: normal  Pulse: present    Comments:  Good passive range of motion of hip  Moving ankle and toes on command  Thigh and calf soft, nontender      Left Hip Exam   Left hip exam is normal             I have personally reviewed pertinent films in PACS and my interpretation is X-rays of the right hip reveal a well-aligned basicervical fracture of the femoral neck with hardware intact

## 2020-01-31 NOTE — ASSESSMENT & PLAN NOTE
Patient is doing well status post right hip greater trochanteric rodding  X-rays reviewed with the patient  Patient does not communicate well due to aphasia  Continue weight-bearing as tolerated to right lower extremity with assistance  Continue treatment with physical therapy  Follow-up in 8 weeks with repeat x-rays  All questions were answered to patient's satisfaction

## (undated) DEVICE — DRESSING MEPILEX AG BORDER 4 X 4 IN

## (undated) DEVICE — 3.2MM GUIDE WIRE 400MM

## (undated) DEVICE — DRESSING MEPILEX AG BORDER 4 X 8 IN

## (undated) DEVICE — GLOVE INDICATOR PI UNDERGLOVE SZ 8 BLUE

## (undated) DEVICE — 4.0MM THREE-FLUTED DRILL BIT QC/260MM/65MM CALIBRATION

## (undated) DEVICE — 1820 FOAM BLOCK NEEDLE COUNTER: Brand: DEVON

## (undated) DEVICE — CHLORAPREP HI-LITE 26ML ORANGE

## (undated) DEVICE — SKIN MARKER DUAL TIP WITH RULER CAP, FLEXIBLE RULER AND LABELS: Brand: DEVON

## (undated) DEVICE — ASTOUND STANDARD SURGICAL GOWN, XXL: Brand: CONVERTORS

## (undated) DEVICE — PENCIL ELECTROSURG E-Z CLEAN -0035H

## (undated) DEVICE — GLOVE SRG BIOGEL 7.5

## (undated) DEVICE — HEAVY DUTY TABLE COVER: Brand: CONVERTORS

## (undated) DEVICE — BULB SYRINGE,IRRIGATION WITH PROTECTIVE CAP: Brand: DOVER

## (undated) DEVICE — GLOVE INDICATOR PI UNDERGLOVE SZ 7 BLUE

## (undated) DEVICE — 6617 IOBAN II PATIENT ISOLATION DRAPE 5/BX,4BX/CS: Brand: STERI-DRAPE™ IOBAN™ 2

## (undated) DEVICE — GLOVE SRG BIOGEL ORTHOPEDIC 7.5

## (undated) DEVICE — SUT VICRYL 0 CT-1 27 IN J260H

## (undated) DEVICE — PROXIMATE PLUS MD MULTI-DIRECTIONAL RELEASE SKIN STAPLERS CONTAINS 35 STAINLESS STEEL STAPLES APPROXIMATE CLOSED DIMENSIONS: 6.9MM X 3.9MM WIDE: Brand: PROXIMATE

## (undated) DEVICE — SUT VICRYL 2-0 CT-2 27 IN J269H

## (undated) DEVICE — GAUZE SPONGES,16 PLY: Brand: CURITY

## (undated) DEVICE — GLOVE SRG BIOGEL 7

## (undated) DEVICE — STANDARD SURGICAL GOWN, L: Brand: CONVERTORS

## (undated) DEVICE — INTENDED FOR TISSUE SEPARATION, AND OTHER PROCEDURES THAT REQUIRE A SHARP SURGICAL BLADE TO PUNCTURE OR CUT.: Brand: BARD-PARKER SAFETY BLADES SIZE 15, STERILE

## (undated) DEVICE — SPONGE LAP 18 X 18 IN